# Patient Record
Sex: FEMALE | Race: OTHER | Employment: UNEMPLOYED | URBAN - METROPOLITAN AREA
[De-identification: names, ages, dates, MRNs, and addresses within clinical notes are randomized per-mention and may not be internally consistent; named-entity substitution may affect disease eponyms.]

---

## 2017-01-01 ENCOUNTER — HOSPITAL ENCOUNTER (OUTPATIENT)
Facility: HOSPITAL | Age: 57
Setting detail: OBSERVATION
Discharge: HOME | End: 2017-01-03
Admitting: HOSPITALIST
Payer: MEDICAID

## 2017-01-01 ENCOUNTER — APPOINTMENT (OUTPATIENT)
Dept: GENERAL RADIOLOGY | Facility: HOSPITAL | Age: 57
End: 2017-01-01
Payer: MEDICAID

## 2017-01-01 DIAGNOSIS — J40 BRONCHITIS: Primary | ICD-10-CM

## 2017-01-01 PROBLEM — R73.9 HYPERGLYCEMIA: Status: ACTIVE | Noted: 2017-01-01

## 2017-01-01 LAB
ANION GAP SERPL CALC-SCNC: 11 MMOL/L (ref 0–18)
BASOPHILS # BLD: 0.1 K/UL (ref 0–0.2)
BASOPHILS NFR BLD: 1 %
BUN SERPL-MCNC: 12 MG/DL (ref 8–20)
BUN/CREAT SERPL: 16.4 (ref 10–20)
CALCIUM SERPL-MCNC: 9.8 MG/DL (ref 8.5–10.5)
CHLORIDE SERPL-SCNC: 103 MMOL/L (ref 95–110)
CO2 SERPL-SCNC: 26 MMOL/L (ref 22–32)
CREAT SERPL-MCNC: 0.73 MG/DL (ref 0.5–1.5)
EOSINOPHIL # BLD: 0.9 K/UL (ref 0–0.7)
EOSINOPHIL NFR BLD: 10 %
ERYTHROCYTE [DISTWIDTH] IN BLOOD BY AUTOMATED COUNT: 14.3 % (ref 11–15)
GLUCOSE BLDC GLUCOMTR-MCNC: 140 MG/DL (ref 70–99)
GLUCOSE BLDC GLUCOMTR-MCNC: 162 MG/DL (ref 70–99)
GLUCOSE SERPL-MCNC: 179 MG/DL (ref 70–99)
HCT VFR BLD AUTO: 37.7 % (ref 35–48)
HGB BLD-MCNC: 12.6 G/DL (ref 12–16)
LYMPHOCYTES # BLD: 3.3 K/UL (ref 1–4)
LYMPHOCYTES NFR BLD: 34 %
MCH RBC QN AUTO: 27.1 PG (ref 27–32)
MCHC RBC AUTO-ENTMCNC: 33.4 G/DL (ref 32–37)
MCV RBC AUTO: 81.1 FL (ref 80–100)
MONOCYTES # BLD: 0.7 K/UL (ref 0–1)
MONOCYTES NFR BLD: 7 %
NEUTROPHILS # BLD AUTO: 4.8 K/UL (ref 1.8–7.7)
NEUTROPHILS NFR BLD: 49 %
OSMOLALITY UR CALC.SUM OF ELEC: 294 MOSM/KG (ref 275–295)
PLATELET # BLD AUTO: 360 K/UL (ref 140–400)
PMV BLD AUTO: 6.5 FL (ref 7.4–10.3)
POTASSIUM SERPL-SCNC: 3.8 MMOL/L (ref 3.3–5.1)
RBC # BLD AUTO: 4.64 M/UL (ref 3.7–5.4)
SODIUM SERPL-SCNC: 140 MMOL/L (ref 136–144)
WBC # BLD AUTO: 9.8 K/UL (ref 4–11)

## 2017-01-01 PROCEDURE — 71020 XR CHEST PA + LAT CHEST (CPT=71020): CPT

## 2017-01-01 RX ORDER — ALBUTEROL SULFATE 2.5 MG/3ML
SOLUTION RESPIRATORY (INHALATION)
Status: COMPLETED
Start: 2017-01-01 | End: 2017-01-01

## 2017-01-01 RX ORDER — IPRATROPIUM BROMIDE AND ALBUTEROL SULFATE 2.5; .5 MG/3ML; MG/3ML
3 SOLUTION RESPIRATORY (INHALATION) EVERY 6 HOURS PRN
Status: DISCONTINUED | OUTPATIENT
Start: 2017-01-01 | End: 2017-01-03

## 2017-01-01 RX ORDER — PREDNISONE 20 MG/1
40 TABLET ORAL ONCE
Status: COMPLETED | OUTPATIENT
Start: 2017-01-01 | End: 2017-01-01

## 2017-01-01 NOTE — ED INITIAL ASSESSMENT (HPI)
Pt with cough, chest congestion x1.5 weeks. Just finished amoxicillin from pmd and still not feeling well. Chest/rib pain with coughing.

## 2017-01-02 LAB
FLUAV + FLUBV RNA SPEC NAA+PROBE: NEGATIVE
FLUAV + FLUBV RNA SPEC NAA+PROBE: NEGATIVE
FLUAV + FLUBV RNA SPEC NAA+PROBE: POSITIVE
GLUCOSE BLDC GLUCOMTR-MCNC: 148 MG/DL (ref 70–99)
GLUCOSE BLDC GLUCOMTR-MCNC: 171 MG/DL (ref 70–99)
GLUCOSE BLDC GLUCOMTR-MCNC: 215 MG/DL (ref 70–99)
GLUCOSE BLDC GLUCOMTR-MCNC: 258 MG/DL (ref 70–99)
GLUCOSE BLDC GLUCOMTR-MCNC: 282 MG/DL (ref 70–99)
HBA1C MFR BLD: 6.9 % (ref 4–6)

## 2017-01-02 PROCEDURE — 99220 INITIAL OBSERVATION CARE,LEVL III: CPT | Performed by: HOSPITALIST

## 2017-01-02 RX ORDER — IPRATROPIUM BROMIDE AND ALBUTEROL SULFATE 2.5; .5 MG/3ML; MG/3ML
3 SOLUTION RESPIRATORY (INHALATION) EVERY 4 HOURS
Status: DISCONTINUED | OUTPATIENT
Start: 2017-01-02 | End: 2017-01-03

## 2017-01-02 RX ORDER — PREDNISONE 20 MG/1
60 TABLET ORAL
Status: DISCONTINUED | OUTPATIENT
Start: 2017-01-02 | End: 2017-01-03

## 2017-01-02 RX ORDER — BENZONATATE 100 MG/1
100 CAPSULE ORAL 3 TIMES DAILY PRN
Status: DISCONTINUED | OUTPATIENT
Start: 2017-01-02 | End: 2017-01-03

## 2017-01-02 RX ORDER — DEXTROSE MONOHYDRATE 25 G/50ML
50 INJECTION, SOLUTION INTRAVENOUS AS NEEDED
Status: DISCONTINUED | OUTPATIENT
Start: 2017-01-02 | End: 2017-01-03

## 2017-01-02 RX ORDER — PREDNISONE 20 MG/1
60 TABLET ORAL DAILY
Status: DISCONTINUED | OUTPATIENT
Start: 2017-01-02 | End: 2017-01-02

## 2017-01-02 RX ORDER — HEPARIN SODIUM 5000 [USP'U]/ML
5000 INJECTION, SOLUTION INTRAVENOUS; SUBCUTANEOUS EVERY 12 HOURS
Status: DISCONTINUED | OUTPATIENT
Start: 2017-01-02 | End: 2017-01-03

## 2017-01-02 RX ORDER — PRAVASTATIN SODIUM 10 MG
10 TABLET ORAL NIGHTLY
Status: DISCONTINUED | OUTPATIENT
Start: 2017-01-02 | End: 2017-01-03

## 2017-01-02 RX ORDER — SODIUM CHLORIDE 0.9 % (FLUSH) 0.9 %
SYRINGE (ML) INJECTION
Status: COMPLETED
Start: 2017-01-02 | End: 2017-01-02

## 2017-01-02 RX ORDER — SODIUM CHLORIDE 9 MG/ML
INJECTION, SOLUTION INTRAVENOUS ONCE
Status: COMPLETED | OUTPATIENT
Start: 2017-01-02 | End: 2017-01-02

## 2017-01-02 RX ORDER — PRAVASTATIN SODIUM 10 MG
10 TABLET ORAL NIGHTLY
Status: DISCONTINUED | OUTPATIENT
Start: 2017-01-02 | End: 2017-01-02

## 2017-01-02 RX ORDER — SODIUM CHLORIDE 9 MG/ML
INJECTION, SOLUTION INTRAVENOUS CONTINUOUS
Status: DISCONTINUED | OUTPATIENT
Start: 2017-01-02 | End: 2017-01-03

## 2017-01-02 RX ORDER — ZOLPIDEM TARTRATE 5 MG/1
5 TABLET ORAL NIGHTLY PRN
Status: DISCONTINUED | OUTPATIENT
Start: 2017-01-02 | End: 2017-01-03

## 2017-01-02 RX ORDER — HEPARIN SODIUM 5000 [USP'U]/ML
5000 INJECTION, SOLUTION INTRAVENOUS; SUBCUTANEOUS EVERY 12 HOURS
Status: DISCONTINUED | OUTPATIENT
Start: 2017-01-02 | End: 2017-01-02

## 2017-01-02 NOTE — PLAN OF CARE
Patient/Family Short Term Goal Not Progressing    Hr to return to normal, shortness of breath, wheezing to resolve

## 2017-01-02 NOTE — PLAN OF CARE
Patient/Family Long Term Goal Not Progressing    Pt.'s shortness of breath, congestion, cough to be resolved; rsv infection resolved

## 2017-01-02 NOTE — H&P
[facility]    PATIENT'S NAME: Bib Kam   ATTENDING PHYSICIAN: Adolph Greene MD   PATIENT ACCOUNT#:   [id number]    LOCATION:  [address] [place] [address] RECORD #:   [id number]       YOB: 1960  ADMISSION DATE:       01/01/2017 to admission, lovastatin 10 mg p.o. nightly, glimepiride 4 mg p.o. daily, Tradjenta 5 mg p.o. daily, metformin 1000 mg p.o. twice a day. ALLERGIES:  Atorvastatin caused a rash. FAMILY HISTORY:  Her mother  in her 76s of lung cancer.   She was hemoglobin of 12.6 and a platelet count of [phone]. There were 49% neutrophils. Her glucose was 179, BUN of 12 with a creatinine of 0.73, sodium of 140, potassium 3.8, chloride 103, CO2 of 26. Chest x-ray revealed no cardiopulmonary infiltrates.

## 2017-01-02 NOTE — PLAN OF CARE
Problem: RESPIRATORY - ADULT  Goal: Achieves optimal ventilation and oxygenation  INTERVENTIONS:  - Assess for changes in respiratory status  - Assess for changes in mentation and behavior  - Position to facilitate oxygenation and minimize respiratory effo

## 2017-01-02 NOTE — PLAN OF CARE
Patient/Family Goals    • Patient/Family Long Term Goal Not Progressing    • Patient/Family Short Term Goal Not Progressing

## 2017-01-03 VITALS
HEART RATE: 109 BPM | OXYGEN SATURATION: 96 % | SYSTOLIC BLOOD PRESSURE: 152 MMHG | HEIGHT: 59 IN | RESPIRATION RATE: 18 BRPM | WEIGHT: 155 LBS | TEMPERATURE: 97 F | DIASTOLIC BLOOD PRESSURE: 78 MMHG | BODY MASS INDEX: 31.25 KG/M2

## 2017-01-03 LAB
ANION GAP SERPL CALC-SCNC: 5 MMOL/L (ref 0–18)
BASOPHILS # BLD: 0 K/UL (ref 0–0.2)
BASOPHILS NFR BLD: 0 %
BUN SERPL-MCNC: 12 MG/DL (ref 8–20)
BUN/CREAT SERPL: 19 (ref 10–20)
CALCIUM SERPL-MCNC: 8.7 MG/DL (ref 8.5–10.5)
CHLORIDE SERPL-SCNC: 109 MMOL/L (ref 95–110)
CO2 SERPL-SCNC: 24 MMOL/L (ref 22–32)
CREAT SERPL-MCNC: 0.63 MG/DL (ref 0.5–1.5)
EOSINOPHIL # BLD: 0 K/UL (ref 0–0.7)
EOSINOPHIL NFR BLD: 0 %
ERYTHROCYTE [DISTWIDTH] IN BLOOD BY AUTOMATED COUNT: 14.5 % (ref 11–15)
GLUCOSE BLDC GLUCOMTR-MCNC: 125 MG/DL (ref 70–99)
GLUCOSE SERPL-MCNC: 217 MG/DL (ref 70–99)
HCT VFR BLD AUTO: 34.3 % (ref 35–48)
HGB BLD-MCNC: 11.4 G/DL (ref 12–16)
LYMPHOCYTES # BLD: 4.5 K/UL (ref 1–4)
LYMPHOCYTES NFR BLD: 37 %
MCH RBC QN AUTO: 26.9 PG (ref 27–32)
MCHC RBC AUTO-ENTMCNC: 33.3 G/DL (ref 32–37)
MCV RBC AUTO: 80.9 FL (ref 80–100)
MONOCYTES # BLD: 0.8 K/UL (ref 0–1)
MONOCYTES NFR BLD: 7 %
NEUTROPHILS # BLD AUTO: 6.9 K/UL (ref 1.8–7.7)
NEUTROPHILS NFR BLD: 56 %
OSMOLALITY UR CALC.SUM OF ELEC: 292 MOSM/KG (ref 275–295)
PLATELET # BLD AUTO: 362 K/UL (ref 140–400)
PMV BLD AUTO: 6.8 FL (ref 7.4–10.3)
POTASSIUM SERPL-SCNC: 3.6 MMOL/L (ref 3.3–5.1)
RBC # BLD AUTO: 4.24 M/UL (ref 3.7–5.4)
SODIUM SERPL-SCNC: 138 MMOL/L (ref 136–144)
WBC # BLD AUTO: 12.4 K/UL (ref 4–11)

## 2017-01-03 PROCEDURE — 99217 OBSERVATION CARE DISCHARGE: CPT | Performed by: HOSPITALIST

## 2017-01-03 RX ORDER — IPRATROPIUM BROMIDE AND ALBUTEROL SULFATE 2.5; .5 MG/3ML; MG/3ML
3 SOLUTION RESPIRATORY (INHALATION) EVERY 6 HOURS PRN
Qty: 360 ML | Refills: 0 | Status: SHIPPED | OUTPATIENT
Start: 2017-01-03 | End: 2017-02-20

## 2017-01-03 RX ORDER — HYDROCODONE POLISTIREX AND CHLORPHENIRAMINE POLISTIREX 10; 8 MG/5ML; MG/5ML
5 SUSPENSION, EXTENDED RELEASE ORAL 2 TIMES DAILY PRN
Qty: 140 ML | Refills: 0 | Status: SHIPPED | OUTPATIENT
Start: 2017-01-03 | End: 2017-01-17

## 2017-01-03 RX ORDER — PREDNISONE 20 MG/1
20 TABLET ORAL
Qty: 9 TABLET | Refills: 0 | Status: SHIPPED | OUTPATIENT
Start: 2017-01-03 | End: 2017-07-06 | Stop reason: ALTCHOICE

## 2017-01-03 RX ORDER — HYDROCODONE POLISTIREX AND CHLORPHENIRAMINE POLISTIREX 10; 8 MG/5ML; MG/5ML
5 SUSPENSION, EXTENDED RELEASE ORAL 2 TIMES DAILY PRN
Status: DISCONTINUED | OUTPATIENT
Start: 2017-01-03 | End: 2017-01-03

## 2017-01-03 NOTE — PLAN OF CARE
Patient to be discharged today/ received med valentin prior to discharge. Mask given to patient to continue med nebs at home. Prescriptions  Given and discharge instructions with follow up care given to patient and .   Both verbalized understanding of d

## 2017-01-03 NOTE — DISCHARGE SUMMARY
[facility]  Discharge Summary    Alycia Carrel Patient Status:  Observation    1960 MRN [id number]   Location [address] [facility] Attending Dwayne Lee MD   Hosp Day # 2 PCP Britney Blankenship MD     Date of Admission: 2017    Date of D 1 tablet (4 mg total) by mouth every morning before breakfast.  Qty: 90 tablet Refills: 0    Blood Glucose Monitoring Suppl (TRUE METRIX METER) W/DEVICE Does not apply Kit  1 kit by Does not apply route 2 (two) times daily.   Qty: 1 kit Refills: 0    Glucos then 20mg x 3 days    Diabetes mellitus type 2.    -hba1c 6.9  -cont home meds    Hyperlipidemia.  Continue statin.        Physical Exam:  Gen: A+Ox 3, nad  Chest:  B/l scattered rales  Heart:  RRR S1,S2  Abdomen:  Soft, NT, BS+  Extremities:  No Edema

## 2017-01-03 NOTE — PLAN OF CARE
Problem: Diabetes/Glucose Control  Goal: Glucose maintained within prescribed range  INTERVENTIONS:  - Monitor Blood Glucose as ordered  - Assess for signs and symptoms of hyperglycemia and hypoglycemia  - Administer ordered medications to maintain glucose

## 2017-01-03 NOTE — PLAN OF CARE
Diabetes/Glucose Control    • Glucose maintained within prescribed range Progressing        Patient/Family Goals    • Patient/Family Long Term Goal Progressing    • Patient/Family Short Term Goal Progressing        RESPIRATORY - ADULT    • Achieves optimal

## 2017-01-03 NOTE — DISCHARGE PLANNING
E Brie Lopez notified of referral for home oxygen. Sleep study in progress    Per Dheeraj Mi pt not qualified for home oxygen.  Referrral cancelled

## 2017-01-04 ENCOUNTER — TELEPHONE (OUTPATIENT)
Dept: MEDSURG UNIT | Facility: HOSPITAL | Age: 57
End: 2017-01-04

## 2017-01-04 ENCOUNTER — TELEPHONE (OUTPATIENT)
Dept: FAMILY MEDICINE CLINIC | Facility: CLINIC | Age: 57
End: 2017-01-04

## 2017-01-05 ENCOUNTER — TELEPHONE (OUTPATIENT)
Dept: INTERNAL MEDICINE UNIT | Facility: HOSPITAL | Age: 57
End: 2017-01-05

## 2017-01-05 NOTE — ED PROVIDER NOTES
Patient Seen in: [facility]    History   Patient presents with:  Cough/URI    Stated Complaint: Bronchitis    HPI     49-year-old female with history of diabetes and hyperlipidemia presents for evaluation of cough.   Patient reports cough for the METRIX BLOOD GLUCOSE TEST) In Vitro Strip,  1 each by Other route 2 (two) times daily.  Apply one strip twice a day for blood glucose monitoring   Blood Glucose Monitoring Suppl (GLUCO PERFECT 3 METER) Does not apply Device,  Inject 1 Device into the skin 2 respiratory distress. Dry bronchospastic cough noted, diffuse wheezing with diminished airflow   Abdominal: Soft. Bowel sounds are normal. She exhibits no distension. There is no tenderness. There is no rebound and no guarding.    Musculoskeletal: Normal Notable for the following:     POC Glucose  258 (*)     All other components within normal limits   POCT GLUCOSE - Abnormal; Notable for the following:     POC Glucose  215 (*)     All other components within normal limits   POCT GLUCOSE - Abnormal; Notabl TALL (BNP)       MDM   Differential diagnosis includes pneumonia, bronchitis, malignancy, viral infection, less likely ACS, PE    Patient with reduced wheezing after 1 hr albuterol treatment, moving air better but still with scattered wheezes.   Started on

## 2017-01-06 ENCOUNTER — TELEPHONE (OUTPATIENT)
Dept: MEDSURG UNIT | Facility: HOSPITAL | Age: 57
End: 2017-01-06

## 2017-01-09 RX ORDER — CODEINE PHOSPHATE/GUAIFENESIN 20-200/10
LIQUID (ML) ORAL
Qty: 180 ML | Refills: 0 | OUTPATIENT
Start: 2017-01-09 | End: 2017-01-10

## 2017-01-09 NOTE — TELEPHONE ENCOUNTER
Pt contacted, seen in [facility] for bronchitis on 1/1/17 and d/c on 1/3/17.  Was prescribed cough syrup as seen on med module:  Hydrocod Polst-CPM Polst ER 10-8 MG/5ML Oral Suspension Extended Release 140 mL 0     however rx not covered by insurance and pharmacy

## 2017-01-10 ENCOUNTER — TELEPHONE (OUTPATIENT)
Dept: FAMILY MEDICINE CLINIC | Facility: CLINIC | Age: 57
End: 2017-01-10

## 2017-01-10 ENCOUNTER — OFFICE VISIT (OUTPATIENT)
Dept: FAMILY MEDICINE CLINIC | Facility: CLINIC | Age: 57
End: 2017-01-10

## 2017-01-10 ENCOUNTER — HOSPITAL ENCOUNTER (OUTPATIENT)
Dept: GENERAL RADIOLOGY | Age: 57
Discharge: HOME | End: 2017-01-10
Attending: FAMILY MEDICINE
Payer: MEDICAID

## 2017-01-10 VITALS
HEART RATE: 98 BPM | BODY MASS INDEX: 31.04 KG/M2 | TEMPERATURE: 98 F | OXYGEN SATURATION: 96 % | DIASTOLIC BLOOD PRESSURE: 76 MMHG | WEIGHT: 154 LBS | SYSTOLIC BLOOD PRESSURE: 144 MMHG | HEIGHT: 59 IN

## 2017-01-10 DIAGNOSIS — R05.9 COUGH: ICD-10-CM

## 2017-01-10 DIAGNOSIS — J21.0 RSV BRONCHIOLITIS: ICD-10-CM

## 2017-01-10 DIAGNOSIS — J32.0 LEFT MAXILLARY SINUSITIS: Primary | ICD-10-CM

## 2017-01-10 PROCEDURE — 99212 OFFICE O/P EST SF 10 MIN: CPT | Performed by: FAMILY MEDICINE

## 2017-01-10 PROCEDURE — 99214 OFFICE O/P EST MOD 30 MIN: CPT | Performed by: FAMILY MEDICINE

## 2017-01-10 PROCEDURE — 71020 XR CHEST PA + LAT CHEST (CPT=71020): CPT

## 2017-01-10 RX ORDER — CODEINE PHOSPHATE AND GUAIFENESIN 10; 100 MG/5ML; MG/5ML
10 SOLUTION ORAL 3 TIMES DAILY PRN
Qty: 180 ML | Refills: 0 | Status: SHIPPED
Start: 2017-01-10 | End: 2017-07-06 | Stop reason: ALTCHOICE

## 2017-01-10 RX ORDER — AZITHROMYCIN 250 MG/1
TABLET, FILM COATED ORAL
Qty: 6 TABLET | Refills: 0 | Status: SHIPPED | OUTPATIENT
Start: 2017-01-10 | End: 2017-01-12 | Stop reason: ALTCHOICE

## 2017-01-10 NOTE — PROGRESS NOTES
HPI:    Patient ID: Jose Castillo is a 60year old female. HPI     Patient here for follow up from [facility]. Was admitted recently for RSV bronchiolitis. Discharged on tapered on prednisone.   Feels better  stil coughing  No sob  Has pressure left side fa times daily as needed (cough). Disp: 140 mL Rfl: 0   Lovastatin 10 MG Oral Tab Take 1 tablet (10 mg total) by mouth nightly. Disp: 90 tablet Rfl: 0   Linagliptin (TRADJENTA) 5 MG Oral Tab Take 1 tablet by mouth once daily.  Disp: 90 tablet Rfl: 0   MetFORMI tenderness. ASSESSMENT/PLAN:   Cough  Left maxillary sinusitis  (primary encounter diagnosis)  Rsv bronchiolitis     1. Cough  Recheck cxr  - XR CHEST PA + LAT CHEST (CPT=71020); Future    2. Left maxillary sinusitis  zpak as directed      3.

## 2017-01-10 NOTE — TELEPHONE ENCOUNTER
Per daughter of pt,  Pt don't want Mychart and would like to know about pt Xray that was done today. Pt is very anxious to know.

## 2017-01-11 NOTE — TELEPHONE ENCOUNTER
Pt's daughter in law Becki Blood, states pt is still not feeling any better. States her cough continues, and yesterdays she was having chest pain. They are still waiting on results. Please advise.

## 2017-01-11 NOTE — TELEPHONE ENCOUNTER
I spoke to daughter and inform her the x ray was normal. She stated that pt continues to cough and was having some chest pain yesterday.  Daughter stated that pt is taking the cough medication ou send her but she continues with the cough and mother was thin

## 2017-01-11 NOTE — TELEPHONE ENCOUNTER
Pt's daughter is calling back. Please advise  States Pt is not feeling well, coughing continues and had chest pain yesterday.

## 2017-01-12 RX ORDER — LEVOFLOXACIN 500 MG/1
500 TABLET, FILM COATED ORAL DAILY
Qty: 5 TABLET | Refills: 0 | Status: SHIPPED | OUTPATIENT
Start: 2017-01-12 | End: 2017-01-17

## 2017-01-12 NOTE — TELEPHONE ENCOUNTER
Pt informed of Dr Christiana Chambers response below. Pt agrees and has scheduled appt for Monday 1/16/17 at 11am in [facility] with AMA; and will stop azithromycin and start levaquin instead (eRx sent to [facility]). Will call sooner if s/sx worsen.

## 2017-01-12 NOTE — TELEPHONE ENCOUNTER
Call transferred by CSS: Daughter Yeny Blair informed of Dr Trang Hernandez ER recommendation if pt is not feeling better. Judith reports pt reported feeling better last night and that she was able to sleep last night. Denies SOB, fever.  Daughter reports chest pain mentioned

## 2017-01-12 NOTE — TELEPHONE ENCOUNTER
Can change cough syrup to other one. Cough syrup already has hydrocodone.   Would recommend stopping azithromycin and changing to levaquin 500mg by mouth daily x 5 days  Follow up Monday with me

## 2017-01-12 NOTE — TELEPHONE ENCOUNTER
Call transferred by CSS: Pt daughter-in-law Saúl Courser asking if Dr Nash Cooney can prescribe a different pain medication that was prescribed by ER that was not being covered= Hydrocod Polst-CPM Polst ER 10-8 MG/5ML. Informed that is more for cough.   States wants somethi

## 2017-02-08 ENCOUNTER — LAB ENCOUNTER (OUTPATIENT)
Dept: LAB | Age: 57
End: 2017-02-08
Attending: FAMILY MEDICINE
Payer: MEDICAID

## 2017-02-08 ENCOUNTER — OFFICE VISIT (OUTPATIENT)
Dept: FAMILY MEDICINE CLINIC | Facility: CLINIC | Age: 57
End: 2017-02-08

## 2017-02-08 VITALS
DIASTOLIC BLOOD PRESSURE: 67 MMHG | SYSTOLIC BLOOD PRESSURE: 130 MMHG | TEMPERATURE: 98 F | BODY MASS INDEX: 30.64 KG/M2 | HEIGHT: 59 IN | OXYGEN SATURATION: 96 % | WEIGHT: 152 LBS | HEART RATE: 85 BPM

## 2017-02-08 DIAGNOSIS — Z00.00 WELL ADULT EXAM: ICD-10-CM

## 2017-02-08 DIAGNOSIS — L84 CALLUS: ICD-10-CM

## 2017-02-08 DIAGNOSIS — M25.50 POLYARTHRALGIA: ICD-10-CM

## 2017-02-08 DIAGNOSIS — E78.00 HYPERCHOLESTEREMIA: ICD-10-CM

## 2017-02-08 DIAGNOSIS — M21.962 FOOT DEFORMITY, LEFT: ICD-10-CM

## 2017-02-08 DIAGNOSIS — E11.319 CONTROLLED TYPE 2 DIABETES MELLITUS WITH RETINOPATHY, WITHOUT LONG-TERM CURRENT USE OF INSULIN, MACULAR EDEMA PRESENCE UNSPECIFIED, UNSPECIFIED LATERALITY, UNSPECIFIED RETINOPATHY SEVERITY (HCC): Primary | ICD-10-CM

## 2017-02-08 DIAGNOSIS — IMO0001 UNCONTROLLED TYPE 2 DIABETES MELLITUS WITHOUT COMPLICATION, WITHOUT LONG-TERM CURRENT USE OF INSULIN: ICD-10-CM

## 2017-02-08 PROBLEM — M21.969 FOOT DEFORMITY: Status: ACTIVE | Noted: 2017-02-08

## 2017-02-08 LAB
ALT SERPL-CCNC: 25 U/L (ref 14–54)
ANION GAP SERPL CALC-SCNC: 9 MMOL/L (ref 0–18)
AST SERPL-CCNC: 23 U/L (ref 15–41)
BASOPHILS # BLD: 0.1 K/UL (ref 0–0.2)
BASOPHILS NFR BLD: 1 %
BUN SERPL-MCNC: 8 MG/DL (ref 8–20)
BUN/CREAT SERPL: 13.1 (ref 10–20)
CALCIUM SERPL-MCNC: 10 MG/DL (ref 8.5–10.5)
CHLORIDE SERPL-SCNC: 103 MMOL/L (ref 95–110)
CHOLEST SERPL-MCNC: 181 MG/DL (ref 110–200)
CO2 SERPL-SCNC: 28 MMOL/L (ref 22–32)
CREAT SERPL-MCNC: 0.61 MG/DL (ref 0.5–1.5)
EOSINOPHIL # BLD: 0.9 K/UL (ref 0–0.7)
EOSINOPHIL NFR BLD: 9 %
ERYTHROCYTE [DISTWIDTH] IN BLOOD BY AUTOMATED COUNT: 14.4 % (ref 11–15)
GLUCOSE SERPL-MCNC: 147 MG/DL (ref 70–99)
HBA1C MFR BLD: 7.2 % (ref 4–6)
HCT VFR BLD AUTO: 39.9 % (ref 35–48)
HDLC SERPL-MCNC: 76 MG/DL
HGB BLD-MCNC: 13.2 G/DL (ref 12–16)
LDLC SERPL CALC-MCNC: 87 MG/DL (ref 0–99)
LYMPHOCYTES # BLD: 3.2 K/UL (ref 1–4)
LYMPHOCYTES NFR BLD: 32 %
MCH RBC QN AUTO: 26.9 PG (ref 27–32)
MCHC RBC AUTO-ENTMCNC: 33.2 G/DL (ref 32–37)
MCV RBC AUTO: 81 FL (ref 80–100)
MONOCYTES # BLD: 0.5 K/UL (ref 0–1)
MONOCYTES NFR BLD: 5 %
NEUTROPHILS # BLD AUTO: 5.3 K/UL (ref 1.8–7.7)
NEUTROPHILS NFR BLD: 53 %
NONHDLC SERPL-MCNC: 105 MG/DL
OSMOLALITY UR CALC.SUM OF ELEC: 291 MOSM/KG (ref 275–295)
PLATELET # BLD AUTO: 409 K/UL (ref 140–400)
PMV BLD AUTO: 6.8 FL (ref 7.4–10.3)
POTASSIUM SERPL-SCNC: 4.3 MMOL/L (ref 3.3–5.1)
RBC # BLD AUTO: 4.92 M/UL (ref 3.7–5.4)
SODIUM SERPL-SCNC: 140 MMOL/L (ref 136–144)
TRIGL SERPL-MCNC: 88 MG/DL (ref 1–149)
TSH SERPL-ACNC: 1.82 UIU/ML (ref 0.34–5.6)
VIT B12 SERPL-MCNC: 220 PG/ML (ref 181–914)
WBC # BLD AUTO: 10 K/UL (ref 4–11)

## 2017-02-08 PROCEDURE — 85025 COMPLETE CBC W/AUTO DIFF WBC: CPT

## 2017-02-08 PROCEDURE — 84443 ASSAY THYROID STIM HORMONE: CPT

## 2017-02-08 PROCEDURE — 36415 COLL VENOUS BLD VENIPUNCTURE: CPT

## 2017-02-08 PROCEDURE — 84450 TRANSFERASE (AST) (SGOT): CPT

## 2017-02-08 PROCEDURE — 82306 VITAMIN D 25 HYDROXY: CPT

## 2017-02-08 PROCEDURE — 83036 HEMOGLOBIN GLYCOSYLATED A1C: CPT

## 2017-02-08 PROCEDURE — 99212 OFFICE O/P EST SF 10 MIN: CPT | Performed by: FAMILY MEDICINE

## 2017-02-08 PROCEDURE — 80061 LIPID PANEL: CPT

## 2017-02-08 PROCEDURE — 82607 VITAMIN B-12: CPT

## 2017-02-08 PROCEDURE — 80048 BASIC METABOLIC PNL TOTAL CA: CPT

## 2017-02-08 PROCEDURE — 84460 ALANINE AMINO (ALT) (SGPT): CPT

## 2017-02-08 PROCEDURE — 99214 OFFICE O/P EST MOD 30 MIN: CPT | Performed by: FAMILY MEDICINE

## 2017-02-08 NOTE — PROGRESS NOTES
HPI:    Patient ID: Pamela Franklin is a 60year old female. HPI Comments: Presents with concerns of worsening arthritis. Reports increased pain to knees, shoulders and hands. States she has noticed swelling in her hands recently.  Reports taking hydrocod Take 10 mL by mouth 3 (three) times daily as needed for cough. Disp: 180 mL Rfl: 0   Fluticasone Furoate-Vilanterol 100-25 MCG/INH Inhalation Aerosol Powder, Breath Activated Inhale 1 puff into the lungs daily.  Disp: 28 each Rfl: 0   predniSONE 20 MG Oral Neurological: She is alert and oriented to person, place, and time. Skin: Skin is dry. Calluses and dryness with cracking to bilateral feet               ASSESSMENT/PLAN:     1. Hypercholesteremia  Stop lovastatin x1 month. Manage with healthy diet.

## 2017-02-10 LAB — 25(OH)D3 SERPL-MCNC: 68.7 NG/ML

## 2017-02-23 RX ORDER — IPRATROPIUM BROMIDE AND ALBUTEROL SULFATE 2.5; .5 MG/3ML; MG/3ML
SOLUTION RESPIRATORY (INHALATION)
Qty: 360 ML | Refills: 0 | Status: SHIPPED | OUTPATIENT
Start: 2017-02-23 | End: 2017-08-08

## 2017-02-23 NOTE — TELEPHONE ENCOUNTER
Refill Protocol Appointment Criteria  · Appointment scheduled in the past 6 months or in the next 3 months  Recent Visits       Provider Department Primary Dx    2 weeks ago Abby Saxena MD [facility], [facility] type 2 diab

## 2017-02-24 RX ORDER — FLUTICASONE FUROATE AND VILANTEROL TRIFENATATE 100; 25 UG/1; UG/1
POWDER RESPIRATORY (INHALATION)
Qty: 60 EACH | Refills: 2 | Status: SHIPPED | OUTPATIENT
Start: 2017-02-24 | End: 2017-08-08

## 2017-02-24 NOTE — TELEPHONE ENCOUNTER
Refilled per protocol  · Appointment scheduled in the past 12 months or in the next 3 months  Recent Visits       Provider Department Primary Dx    2 weeks ago Titus Mays MD [facility], [facility], Pat Controlled type 2 diabetes mellitus

## 2017-04-06 RX ORDER — GLIMEPIRIDE 4 MG/1
TABLET ORAL
Qty: 90 TABLET | Refills: 0 | Status: SHIPPED | OUTPATIENT
Start: 2017-04-06 | End: 2017-07-01

## 2017-04-06 NOTE — TELEPHONE ENCOUNTER
Diabetes Medications: Medication filled for 90 days per protocol.     Protocol Criteria:  · Appointment scheduled in the past 6 months or the next 3 months  · A1C < 7.5 in the past 6 months  · Creatinine in the past 12 months  · Creatinine result < 1.5   Re

## 2017-07-06 ENCOUNTER — OFFICE VISIT (OUTPATIENT)
Dept: FAMILY MEDICINE CLINIC | Facility: CLINIC | Age: 57
End: 2017-07-06

## 2017-07-06 VITALS
HEIGHT: 59 IN | SYSTOLIC BLOOD PRESSURE: 110 MMHG | HEART RATE: 89 BPM | TEMPERATURE: 98 F | WEIGHT: 155 LBS | BODY MASS INDEX: 31.25 KG/M2 | DIASTOLIC BLOOD PRESSURE: 73 MMHG

## 2017-07-06 DIAGNOSIS — E66.9 OBESITY (BMI 30.0-34.9): ICD-10-CM

## 2017-07-06 DIAGNOSIS — IMO0001 UNCONTROLLED TYPE 2 DIABETES MELLITUS WITHOUT COMPLICATION, WITHOUT LONG-TERM CURRENT USE OF INSULIN: Primary | ICD-10-CM

## 2017-07-06 DIAGNOSIS — Z12.11 COLON CANCER SCREENING: ICD-10-CM

## 2017-07-06 DIAGNOSIS — E78.00 HYPERCHOLESTEREMIA: ICD-10-CM

## 2017-07-06 DIAGNOSIS — M25.50 POLYARTHRALGIA: ICD-10-CM

## 2017-07-06 PROCEDURE — 99214 OFFICE O/P EST MOD 30 MIN: CPT | Performed by: FAMILY MEDICINE

## 2017-07-06 PROCEDURE — 99212 OFFICE O/P EST SF 10 MIN: CPT | Performed by: FAMILY MEDICINE

## 2017-07-06 NOTE — PROGRESS NOTES
HPI:    Patient ID: Alycia Carrel is a 59year old female. Diabetes   She presents for her follow-up diabetic visit. She has type 2 diabetes mellitus. Hypoglycemia symptoms include nervousness/anxiousness.  Pertinent negatives for hypoglycemia include n Pulse 89   Temp 97.9 °F (36.6 °C) (Oral)   Ht 4' 11\" (1.499 m)   Wt 155 lb (70.3 kg)   LMP  (Exact Date)   BMI 31.31 kg/m²        Current Outpatient Prescriptions:  METFORMIN HCL 1000 MG Oral Tab TAKE 1 TABLET (1,000 MG TOTAL) BY MOUTH 2 (TWO) TIMES DOE No thyromegaly present. Cardiovascular: Normal rate and regular rhythm. Pulmonary/Chest: Effort normal and breath sounds normal.   Abdominal: Soft. Bowel sounds are normal. There is no tenderness. Musculoskeletal: She exhibits no edema.    Cuong Choi Microalb/Creat Ratio, Random Urine [E]      Occult Blood, Fecal, Immunoassay [E]      Uric Acid, Serum [E]    Meds This Visit:  No prescriptions requested or ordered in this encounter    Imaging & Referrals:  RHEUMATOLOGY - INTERNAL  OPTOMETRY - INTERNAL

## 2017-07-08 RX ORDER — GLIMEPIRIDE 4 MG/1
TABLET ORAL
Qty: 90 TABLET | Refills: 0 | Status: SHIPPED | OUTPATIENT
Start: 2017-07-08 | End: 2017-12-17

## 2017-07-17 ENCOUNTER — APPOINTMENT (OUTPATIENT)
Dept: LAB | Age: 57
End: 2017-07-17
Attending: FAMILY MEDICINE
Payer: COMMERCIAL

## 2017-07-17 DIAGNOSIS — IMO0001 UNCONTROLLED TYPE 2 DIABETES MELLITUS WITHOUT COMPLICATION, WITHOUT LONG-TERM CURRENT USE OF INSULIN: ICD-10-CM

## 2017-07-17 DIAGNOSIS — M25.50 POLYARTHRALGIA: ICD-10-CM

## 2017-07-17 DIAGNOSIS — E78.00 HYPERCHOLESTEREMIA: ICD-10-CM

## 2017-07-17 LAB
ALT SERPL-CCNC: 28 U/L (ref 14–54)
ANION GAP SERPL CALC-SCNC: 8 MMOL/L (ref 0–18)
AST SERPL-CCNC: 24 U/L (ref 15–41)
BUN SERPL-MCNC: 6 MG/DL (ref 8–20)
BUN/CREAT SERPL: 8.2 (ref 10–20)
CALCIUM SERPL-MCNC: 9.5 MG/DL (ref 8.5–10.5)
CHLORIDE SERPL-SCNC: 103 MMOL/L (ref 95–110)
CHOLEST SERPL-MCNC: 215 MG/DL (ref 110–200)
CO2 SERPL-SCNC: 26 MMOL/L (ref 22–32)
CREAT SERPL-MCNC: 0.73 MG/DL (ref 0.5–1.5)
CREAT UR-MCNC: 139 MG/DL
GLUCOSE SERPL-MCNC: 161 MG/DL (ref 70–99)
HBA1C MFR BLD: 7.9 % (ref 4–6)
HDLC SERPL-MCNC: 70 MG/DL
LDLC SERPL CALC-MCNC: 120 MG/DL (ref 0–99)
MICROALBUMIN UR-MCNC: 2.8 MG/DL (ref 0–1.8)
MICROALBUMIN/CREAT UR: 20.1 MG/G{CREAT} (ref 0–20)
NONHDLC SERPL-MCNC: 145 MG/DL
OSMOLALITY UR CALC.SUM OF ELEC: 285 MOSM/KG (ref 275–295)
POTASSIUM SERPL-SCNC: 4.2 MMOL/L (ref 3.3–5.1)
SODIUM SERPL-SCNC: 137 MMOL/L (ref 136–144)
TRIGL SERPL-MCNC: 124 MG/DL (ref 1–149)
URATE SERPL-MCNC: 4.7 MG/DL (ref 2.1–7.4)

## 2017-07-17 PROCEDURE — 84460 ALANINE AMINO (ALT) (SGPT): CPT

## 2017-07-17 PROCEDURE — 82043 UR ALBUMIN QUANTITATIVE: CPT

## 2017-07-17 PROCEDURE — 36415 COLL VENOUS BLD VENIPUNCTURE: CPT

## 2017-07-17 PROCEDURE — 82570 ASSAY OF URINE CREATININE: CPT

## 2017-07-17 PROCEDURE — 84550 ASSAY OF BLOOD/URIC ACID: CPT

## 2017-07-17 PROCEDURE — 80048 BASIC METABOLIC PNL TOTAL CA: CPT

## 2017-07-17 PROCEDURE — 80061 LIPID PANEL: CPT

## 2017-07-17 PROCEDURE — 83036 HEMOGLOBIN GLYCOSYLATED A1C: CPT

## 2017-07-17 PROCEDURE — 84450 TRANSFERASE (AST) (SGOT): CPT

## 2017-07-18 ENCOUNTER — TELEPHONE (OUTPATIENT)
Dept: FAMILY MEDICINE CLINIC | Facility: CLINIC | Age: 57
End: 2017-07-18

## 2017-08-08 ENCOUNTER — OFFICE VISIT (OUTPATIENT)
Dept: RHEUMATOLOGY | Facility: CLINIC | Age: 57
End: 2017-08-08

## 2017-08-08 VITALS
HEIGHT: 58 IN | BODY MASS INDEX: 33 KG/M2 | DIASTOLIC BLOOD PRESSURE: 84 MMHG | HEART RATE: 112 BPM | TEMPERATURE: 98 F | WEIGHT: 157.19 LBS | SYSTOLIC BLOOD PRESSURE: 138 MMHG | OXYGEN SATURATION: 94 %

## 2017-08-08 DIAGNOSIS — R53.83 FATIGUE, UNSPECIFIED TYPE: ICD-10-CM

## 2017-08-08 DIAGNOSIS — IMO0001 UNCONTROLLED TYPE 2 DIABETES MELLITUS WITHOUT COMPLICATION, WITHOUT LONG-TERM CURRENT USE OF INSULIN: ICD-10-CM

## 2017-08-08 DIAGNOSIS — E66.9 OBESITY (BMI 30.0-34.9): ICD-10-CM

## 2017-08-08 DIAGNOSIS — M25.50 POLYARTHRALGIA: Primary | ICD-10-CM

## 2017-08-08 DIAGNOSIS — F41.9 ANXIETY: ICD-10-CM

## 2017-08-08 PROCEDURE — 99212 OFFICE O/P EST SF 10 MIN: CPT | Performed by: INTERNAL MEDICINE

## 2017-08-08 PROCEDURE — 99244 OFF/OP CNSLTJ NEW/EST MOD 40: CPT | Performed by: INTERNAL MEDICINE

## 2017-08-08 NOTE — PROGRESS NOTES
Dear Dr. Peyton Ervin:    I saw your patient Grecia Larsen in consultation this afternoon at your request for evaluation of diffuse musculoskeletal pain.   As you know, she is a 49-year-old woman who has a history of bilateral shoulder discomfort, but 5 months ago weeks.  She has had shingles over her forehead. She is on glimepiride, linagliptin, Metformin. Occasional tramadol for severe pain. Family history:  Negative for arthritis or autoimmune disorders. Social history:  She is  with 3 children.   Elijah Gun limited and painful bilaterally. There is marked crepitance in her knees, with flexion and extension. Her history isn't good for autoimmune disorder. She could have rotator cuff pathology and knee osteoarthritis. X-rays will be done.   Multiple labs chica

## 2017-08-15 ENCOUNTER — HOSPITAL ENCOUNTER (OUTPATIENT)
Dept: GENERAL RADIOLOGY | Age: 57
Discharge: HOME | End: 2017-08-15
Attending: INTERNAL MEDICINE
Payer: COMMERCIAL

## 2017-08-15 ENCOUNTER — APPOINTMENT (OUTPATIENT)
Dept: LAB | Age: 57
End: 2017-08-15
Attending: INTERNAL MEDICINE
Payer: COMMERCIAL

## 2017-08-15 ENCOUNTER — TELEPHONE (OUTPATIENT)
Dept: FAMILY MEDICINE CLINIC | Facility: CLINIC | Age: 57
End: 2017-08-15

## 2017-08-15 DIAGNOSIS — M25.50 POLYARTHRALGIA: ICD-10-CM

## 2017-08-15 DIAGNOSIS — R53.83 FATIGUE, UNSPECIFIED TYPE: ICD-10-CM

## 2017-08-15 LAB
ALBUMIN SERPL BCP-MCNC: 3.7 G/DL (ref 3.5–4.8)
ALBUMIN/GLOB SERPL: 1.1 {RATIO} (ref 1–2)
ALP SERPL-CCNC: 69 U/L (ref 32–100)
ALT SERPL-CCNC: 27 U/L (ref 14–54)
ANION GAP SERPL CALC-SCNC: 8 MMOL/L (ref 0–18)
AST SERPL-CCNC: 22 U/L (ref 15–41)
BILIRUB SERPL-MCNC: 0.5 MG/DL (ref 0.3–1.2)
BUN SERPL-MCNC: 8 MG/DL (ref 8–20)
BUN/CREAT SERPL: 13.1 (ref 10–20)
C3 SERPL-MCNC: 174 MG/DL (ref 88–201)
C4 SERPL-MCNC: 30 MG/DL (ref 18–55)
CALCIUM SERPL-MCNC: 9.7 MG/DL (ref 8.5–10.5)
CHLORIDE SERPL-SCNC: 104 MMOL/L (ref 95–110)
CO2 SERPL-SCNC: 26 MMOL/L (ref 22–32)
CREAT SERPL-MCNC: 0.61 MG/DL (ref 0.5–1.5)
CRP SERPL-MCNC: 0.7 MG/DL (ref 0–0.9)
ERYTHROCYTE [DISTWIDTH] IN BLOOD BY AUTOMATED COUNT: 14.1 % (ref 11–15)
ERYTHROCYTE [SEDIMENTATION RATE] IN BLOOD: 17 MM/HR (ref 0–30)
GLOBULIN PLAS-MCNC: 3.3 G/DL (ref 2.5–3.7)
GLUCOSE SERPL-MCNC: 142 MG/DL (ref 70–99)
HCT VFR BLD AUTO: 38.7 % (ref 35–48)
HGB BLD-MCNC: 12.8 G/DL (ref 12–16)
MCH RBC QN AUTO: 26.7 PG (ref 27–32)
MCHC RBC AUTO-ENTMCNC: 33.1 G/DL (ref 32–37)
MCV RBC AUTO: 80.8 FL (ref 80–100)
OSMOLALITY UR CALC.SUM OF ELEC: 287 MOSM/KG (ref 275–295)
PLATELET # BLD AUTO: 410 K/UL (ref 140–400)
PMV BLD AUTO: 6.8 FL (ref 7.4–10.3)
POTASSIUM SERPL-SCNC: 4.1 MMOL/L (ref 3.3–5.1)
PROT SERPL-MCNC: 7 G/DL (ref 5.9–8.4)
RBC # BLD AUTO: 4.79 M/UL (ref 3.7–5.4)
RHEUMATOID FACT SER QL: <5 IU/ML
SODIUM SERPL-SCNC: 138 MMOL/L (ref 136–144)
TSH SERPL-ACNC: 3.04 UIU/ML (ref 0.45–5.33)
WBC # BLD AUTO: 11.7 K/UL (ref 4–11)

## 2017-08-15 PROCEDURE — 85652 RBC SED RATE AUTOMATED: CPT

## 2017-08-15 PROCEDURE — 73030 X-RAY EXAM OF SHOULDER: CPT | Performed by: INTERNAL MEDICINE

## 2017-08-15 PROCEDURE — 80053 COMPREHEN METABOLIC PANEL: CPT

## 2017-08-15 PROCEDURE — 86140 C-REACTIVE PROTEIN: CPT

## 2017-08-15 PROCEDURE — 86431 RHEUMATOID FACTOR QUANT: CPT

## 2017-08-15 PROCEDURE — 86200 CCP ANTIBODY: CPT

## 2017-08-15 PROCEDURE — 86160 COMPLEMENT ANTIGEN: CPT

## 2017-08-15 PROCEDURE — 73565 X-RAY EXAM OF KNEES: CPT | Performed by: INTERNAL MEDICINE

## 2017-08-15 PROCEDURE — 36415 COLL VENOUS BLD VENIPUNCTURE: CPT

## 2017-08-15 PROCEDURE — 86747 PARVOVIRUS ANTIBODY: CPT

## 2017-08-15 PROCEDURE — 84443 ASSAY THYROID STIM HORMONE: CPT

## 2017-08-15 PROCEDURE — 86038 ANTINUCLEAR ANTIBODIES: CPT

## 2017-08-15 PROCEDURE — 85027 COMPLETE CBC AUTOMATED: CPT

## 2017-08-15 PROCEDURE — 73130 X-RAY EXAM OF HAND: CPT | Performed by: INTERNAL MEDICINE

## 2017-08-15 NOTE — TELEPHONE ENCOUNTER
Patient would like to speak to Dr Kiara Lopez, Did not specify the reason   Patient will be traveling to her country on Monday 8/21  Please advs

## 2017-08-16 LAB
CCP IGG SERPL-ACNC: 0.8 U/ML (ref 0–6.9)
PARVOVIRUS B19 ANTIBODY IGG: 0.82 IV
PARVOVIRUS B19 ANTIBODY IGM: 0.16 IV

## 2017-08-16 NOTE — TELEPHONE ENCOUNTER
Patient is going out of country on Monday [place]   She wants to know the results of test and also if she needs to bring  Any additional medication on the plane.

## 2017-08-17 ENCOUNTER — TELEPHONE (OUTPATIENT)
Dept: RHEUMATOLOGY | Facility: CLINIC | Age: 57
End: 2017-08-17

## 2017-08-17 LAB — ANA SER QL: NEGATIVE

## 2017-08-17 NOTE — TELEPHONE ENCOUNTER
Please call pt and schedule follow up appt as requested per provider. May use \"RN only\" if needed.

## 2017-09-18 ENCOUNTER — TELEPHONE (OUTPATIENT)
Dept: PODIATRY CLINIC | Facility: CLINIC | Age: 57
End: 2017-09-18

## 2017-09-18 ENCOUNTER — OFFICE VISIT (OUTPATIENT)
Dept: OPTOMETRY | Facility: CLINIC | Age: 57
End: 2017-09-18

## 2017-09-18 DIAGNOSIS — E11.9 CONTROLLED TYPE 2 DIABETES MELLITUS WITHOUT COMPLICATION, WITHOUT LONG-TERM CURRENT USE OF INSULIN (HCC): Primary | ICD-10-CM

## 2017-09-18 DIAGNOSIS — H25.13 AGE-RELATED NUCLEAR CATARACT OF BOTH EYES: ICD-10-CM

## 2017-09-18 PROCEDURE — 92014 COMPRE OPH EXAM EST PT 1/>: CPT | Performed by: OPTOMETRIST

## 2017-09-18 NOTE — PATIENT INSTRUCTIONS
Age related cataract  No treatment is required. Will continue to observe.     Controlled type 2 diabetes mellitus without complication, without long-term current use of insulin ([address]  I advised patient that there is no background diabetic retinopathy in eit

## 2017-09-18 NOTE — TELEPHONE ENCOUNTER
Called pt LMOM that we are no longer accepting [employer] insurance starting 10/1/2017. [facility] can [facility] at [phone] to help assist them in finding an in network doctor.

## 2017-09-18 NOTE — PROGRESS NOTES
Bishop Avila is a 59year old female. HPI:     HPI     Diabetic Eye Exam   Diabetes characteristics include Type 2, controlled with diet and taking oral medications. Duration of 10 years.            Comments   Patient is in for an annual diabetic eye Visual Acuity (Snellen - Linear)       Right Left    Dist cc 20/25 20/25    Correction:  Glasses          Tonometry (Non-contact air puff, 11:48 AM)       Right Left    Pressure 18 20          Pupils       Pupils    Right PERRL    Left PERRL          Vi directed. I stressed the importance of yearly diabetic eye exams. Patient has been advised to call immediately if they notice any changes or problems with their vision       No orders of the defined types were placed in this encounter.       Meds This Visit

## 2017-09-25 ENCOUNTER — OFFICE VISIT (OUTPATIENT)
Dept: PODIATRY CLINIC | Facility: CLINIC | Age: 57
End: 2017-09-25

## 2017-09-25 DIAGNOSIS — E11.9 CONTROLLED TYPE 2 DIABETES MELLITUS WITHOUT COMPLICATION, WITHOUT LONG-TERM CURRENT USE OF INSULIN (HCC): Primary | ICD-10-CM

## 2017-09-25 PROCEDURE — 99212 OFFICE O/P EST SF 10 MIN: CPT | Performed by: PODIATRIST

## 2017-09-25 PROCEDURE — 99243 OFF/OP CNSLTJ NEW/EST LOW 30: CPT | Performed by: PODIATRIST

## 2017-09-25 NOTE — PROGRESS NOTES
HPI:    Patient ID: Jaguar Juares is a 59year old female. HPI  This pleasant 68-year-old female presents as a new patient to me on consult from . She states that she is here for a diabetic foot evaluation.   She states that she has been a diabe understanding of my instructions. Plan follow-up in 1 year but she was encouraged to call immediately with any foot related concerns.          ASSESSMENT/PLAN:   Controlled type 2 diabetes mellitus without complication, without long-term current use of ins

## 2017-12-18 RX ORDER — GLIMEPIRIDE 4 MG/1
TABLET ORAL
Qty: 90 TABLET | Refills: 1 | Status: SHIPPED | OUTPATIENT
Start: 2017-12-18 | End: 2018-02-22

## 2018-02-06 ENCOUNTER — OFFICE VISIT (OUTPATIENT)
Dept: FAMILY MEDICINE CLINIC | Facility: CLINIC | Age: 58
End: 2018-02-06

## 2018-02-06 VITALS
DIASTOLIC BLOOD PRESSURE: 79 MMHG | BODY MASS INDEX: 32.32 KG/M2 | HEIGHT: 58 IN | WEIGHT: 154 LBS | SYSTOLIC BLOOD PRESSURE: 128 MMHG | TEMPERATURE: 99 F | HEART RATE: 94 BPM

## 2018-02-06 DIAGNOSIS — Z12.31 ENCOUNTER FOR SCREENING MAMMOGRAM FOR BREAST CANCER: ICD-10-CM

## 2018-02-06 DIAGNOSIS — E78.00 HYPERCHOLESTEREMIA: ICD-10-CM

## 2018-02-06 DIAGNOSIS — Z12.11 COLON CANCER SCREENING: ICD-10-CM

## 2018-02-06 DIAGNOSIS — E11.319 CONTROLLED TYPE 2 DIABETES MELLITUS WITH RETINOPATHY, WITHOUT LONG-TERM CURRENT USE OF INSULIN, MACULAR EDEMA PRESENCE UNSPECIFIED, UNSPECIFIED LATERALITY, UNSPECIFIED RETINOPATHY SEVERITY (HCC): Primary | ICD-10-CM

## 2018-02-06 PROCEDURE — 99212 OFFICE O/P EST SF 10 MIN: CPT | Performed by: FAMILY MEDICINE

## 2018-02-06 PROCEDURE — 99214 OFFICE O/P EST MOD 30 MIN: CPT | Performed by: FAMILY MEDICINE

## 2018-02-06 NOTE — PROGRESS NOTES
HPI:    Patient ID: Rosendo Ortega is a 59year old female. Shoulder Pain    Pertinent negatives include no fever. Diabetes   She presents for her follow-up diabetic visit. She has type 2 diabetes mellitus. Hypoglycemia symptoms include hunger.  Pertin BREAKFAST Disp: 90 tablet Rfl: 1   METFORMIN HCL 1000 MG Oral Tab TAKE 1 TABLET BY MOUTH TWICE A DAY Disp: 180 tablet Rfl: 1   TraMADol HCl 50 MG Oral Tab Take 1 tablet (50 mg total) by mouth every 8 (eight) hours as needed for Pain.  Disp: 40 tablet Rfl: 0 Future    2. Hypercholesteremia  NOT ON ANY STATIN  - COMP METABOLIC PANEL (14); Future  - LIPID PANEL; Future    3. Encounter for screening mammogram for breast cancer    - DONAL SCREENING BILAT (CPT=77067); Future    4.  Colon cancer screening    - OCCULT B

## 2018-02-14 ENCOUNTER — HOSPITAL ENCOUNTER (OUTPATIENT)
Dept: MAMMOGRAPHY | Age: 58
Discharge: HOME | End: 2018-02-14
Attending: FAMILY MEDICINE
Payer: MEDICAID

## 2018-02-14 DIAGNOSIS — Z12.31 ENCOUNTER FOR SCREENING MAMMOGRAM FOR BREAST CANCER: ICD-10-CM

## 2018-02-14 PROCEDURE — 77067 SCR MAMMO BI INCL CAD: CPT | Performed by: FAMILY MEDICINE

## 2018-02-21 ENCOUNTER — APPOINTMENT (OUTPATIENT)
Dept: LAB | Age: 58
End: 2018-02-21
Attending: FAMILY MEDICINE
Payer: MEDICAID

## 2018-02-21 DIAGNOSIS — E11.319 CONTROLLED TYPE 2 DIABETES MELLITUS WITH RETINOPATHY, WITHOUT LONG-TERM CURRENT USE OF INSULIN, MACULAR EDEMA PRESENCE UNSPECIFIED, UNSPECIFIED LATERALITY, UNSPECIFIED RETINOPATHY SEVERITY (HCC): ICD-10-CM

## 2018-02-21 DIAGNOSIS — E78.00 HYPERCHOLESTEREMIA: ICD-10-CM

## 2018-02-21 LAB
ALBUMIN SERPL BCP-MCNC: 3.9 G/DL (ref 3.5–4.8)
ALBUMIN/GLOB SERPL: 1.1 {RATIO} (ref 1–2)
ALP SERPL-CCNC: 72 U/L (ref 32–100)
ALT SERPL-CCNC: 38 U/L (ref 14–54)
ANION GAP SERPL CALC-SCNC: 9 MMOL/L (ref 0–18)
AST SERPL-CCNC: 32 U/L (ref 15–41)
BILIRUB SERPL-MCNC: 0.6 MG/DL (ref 0.3–1.2)
BUN SERPL-MCNC: 8 MG/DL (ref 8–20)
BUN/CREAT SERPL: 12.1 (ref 10–20)
CALCIUM SERPL-MCNC: 9.8 MG/DL (ref 8.5–10.5)
CHLORIDE SERPL-SCNC: 103 MMOL/L (ref 95–110)
CHOLEST SERPL-MCNC: 217 MG/DL (ref 110–200)
CO2 SERPL-SCNC: 26 MMOL/L (ref 22–32)
CREAT SERPL-MCNC: 0.66 MG/DL (ref 0.5–1.5)
CREAT UR-MCNC: 167.3 MG/DL
GLOBULIN PLAS-MCNC: 3.5 G/DL (ref 2.5–3.7)
GLUCOSE SERPL-MCNC: 151 MG/DL (ref 70–99)
HBA1C MFR BLD: 8 % (ref 4–6)
HDLC SERPL-MCNC: 61 MG/DL
LDLC SERPL CALC-MCNC: 127 MG/DL (ref 0–99)
MICROALBUMIN UR-MCNC: 3.9 MG/DL (ref 0–1.8)
MICROALBUMIN/CREAT UR: 23.3 MG/G{CREAT} (ref 0–20)
NONHDLC SERPL-MCNC: 156 MG/DL
OSMOLALITY UR CALC.SUM OF ELEC: 287 MOSM/KG (ref 275–295)
PATIENT FASTING: YES
POTASSIUM SERPL-SCNC: 4 MMOL/L (ref 3.3–5.1)
PROT SERPL-MCNC: 7.4 G/DL (ref 5.9–8.4)
SODIUM SERPL-SCNC: 138 MMOL/L (ref 136–144)
TRIGL SERPL-MCNC: 144 MG/DL (ref 1–149)
TSH SERPL-ACNC: 2.41 UIU/ML (ref 0.45–5.33)

## 2018-02-21 PROCEDURE — 80061 LIPID PANEL: CPT

## 2018-02-21 PROCEDURE — 82570 ASSAY OF URINE CREATININE: CPT

## 2018-02-21 PROCEDURE — 36415 COLL VENOUS BLD VENIPUNCTURE: CPT

## 2018-02-21 PROCEDURE — 84443 ASSAY THYROID STIM HORMONE: CPT

## 2018-02-21 PROCEDURE — 80053 COMPREHEN METABOLIC PANEL: CPT

## 2018-02-21 PROCEDURE — 83036 HEMOGLOBIN GLYCOSYLATED A1C: CPT

## 2018-02-21 PROCEDURE — 82043 UR ALBUMIN QUANTITATIVE: CPT

## 2018-02-22 ENCOUNTER — TELEPHONE (OUTPATIENT)
Dept: FAMILY MEDICINE CLINIC | Facility: CLINIC | Age: 58
End: 2018-02-22

## 2018-02-22 ENCOUNTER — OFFICE VISIT (OUTPATIENT)
Dept: FAMILY MEDICINE CLINIC | Facility: CLINIC | Age: 58
End: 2018-02-22

## 2018-02-22 VITALS
TEMPERATURE: 97 F | HEART RATE: 96 BPM | SYSTOLIC BLOOD PRESSURE: 118 MMHG | DIASTOLIC BLOOD PRESSURE: 78 MMHG | WEIGHT: 154 LBS | HEIGHT: 58 IN | BODY MASS INDEX: 32.32 KG/M2

## 2018-02-22 DIAGNOSIS — Z00.00 WELL ADULT EXAM: Primary | ICD-10-CM

## 2018-02-22 DIAGNOSIS — Z01.419 ENCOUNTER FOR GYNECOLOGICAL EXAMINATION: ICD-10-CM

## 2018-02-22 DIAGNOSIS — Z12.11 COLON CANCER SCREENING: ICD-10-CM

## 2018-02-22 DIAGNOSIS — E78.00 HYPERCHOLESTEREMIA: ICD-10-CM

## 2018-02-22 DIAGNOSIS — M25.50 POLYARTHRALGIA: ICD-10-CM

## 2018-02-22 DIAGNOSIS — E11.9 CONTROLLED TYPE 2 DIABETES MELLITUS WITHOUT COMPLICATION, WITHOUT LONG-TERM CURRENT USE OF INSULIN (HCC): ICD-10-CM

## 2018-02-22 DIAGNOSIS — R92.2 DENSE BREAST TISSUE ON MAMMOGRAM: ICD-10-CM

## 2018-02-22 DIAGNOSIS — E66.9 OBESITY (BMI 30.0-34.9): ICD-10-CM

## 2018-02-22 PROBLEM — R92.30 DENSE BREAST TISSUE ON MAMMOGRAM: Status: ACTIVE | Noted: 2018-02-22

## 2018-02-22 PROCEDURE — 99396 PREV VISIT EST AGE 40-64: CPT | Performed by: FAMILY MEDICINE

## 2018-02-22 RX ORDER — GLIMEPIRIDE 4 MG/1
4 TABLET ORAL
COMMUNITY
End: 2018-07-12

## 2018-02-22 NOTE — PROGRESS NOTES
HPI:   Sandra Del Cid is a 59year old female who presents for a complete physical exam. Symptoms: is menopausal.     Wt Readings from Last 6 Encounters:  02/22/18 : 154 lb (69.9 kg)  02/06/18 : 154 lb (69.9 kg)  08/08/17 : 157 lb 3.2 oz (71.3 kg)  07/06/1 Father    • Diabetes Mother    • Diabetes Brother    • Hypertension Brother    • Breast Cancer Paternal Aunt 79   • Breast Cancer Paternal Cousin Female 36   • Cancer Other      cousin, breast cancer   • Glaucoma Neg       Social History:   Smoking status: rashes,no suspicious lesions  HEENT: atraumatic, normocephalic,ears and throat are clear  EYES:PERRLA, EOMI,conjunctiva are clear  NECK: supple, no adenopathy, no bruits  CHEST: no chest tenderness  BREAST: no dominant or suspicious mass  LUNGS: clear to a home and will try and let me know which one    5. Controlled type 2 diabetes mellitus without complication, without long-term current use of insulin (HCC)  See endo  - ENDOCRINOLOGY - INTERNAL    6.  Colon cancer screening  Declines colonoscopy  - OCCULT BL

## 2018-02-22 NOTE — TELEPHONE ENCOUNTER
Spouse said pt is taking Lovastatin 10 mg for her cholesterol     Pt saw Dr Peyton Ervin today-was to callback to provide this info

## 2018-02-23 NOTE — TELEPHONE ENCOUNTER
please see pt message below and advise. Are you keeping her on this medication? LMTCB please transfer to triage. I just wanted to inform pt that  will be in on Monday. Does pt need medication now?

## 2018-02-25 RX ORDER — LOVASTATIN 10 MG/1
10 TABLET ORAL NIGHTLY
COMMUNITY
End: 2018-02-26

## 2018-02-26 PROBLEM — IMO0001 UNCONTROLLED TYPE 2 DIABETES MELLITUS WITHOUT COMPLICATION, WITHOUT LONG-TERM CURRENT USE OF INSULIN: Status: ACTIVE | Noted: 2017-02-08

## 2018-04-10 ENCOUNTER — OFFICE VISIT (OUTPATIENT)
Dept: RHEUMATOLOGY | Facility: CLINIC | Age: 58
End: 2018-04-10

## 2018-04-10 VITALS
SYSTOLIC BLOOD PRESSURE: 144 MMHG | TEMPERATURE: 98 F | WEIGHT: 152.13 LBS | HEIGHT: 58 IN | HEART RATE: 90 BPM | DIASTOLIC BLOOD PRESSURE: 79 MMHG | BODY MASS INDEX: 31.93 KG/M2

## 2018-04-10 DIAGNOSIS — M15.9 PRIMARY OSTEOARTHRITIS INVOLVING MULTIPLE JOINTS: Primary | ICD-10-CM

## 2018-04-10 DIAGNOSIS — M67.912 DISORDER OF LEFT ROTATOR CUFF: ICD-10-CM

## 2018-04-10 PROBLEM — M15.0 PRIMARY OSTEOARTHRITIS INVOLVING MULTIPLE JOINTS: Status: ACTIVE | Noted: 2018-04-10

## 2018-04-10 PROCEDURE — 99213 OFFICE O/P EST LOW 20 MIN: CPT | Performed by: INTERNAL MEDICINE

## 2018-04-10 PROCEDURE — 20610 DRAIN/INJ JOINT/BURSA W/O US: CPT | Performed by: INTERNAL MEDICINE

## 2018-04-10 PROCEDURE — 99212 OFFICE O/P EST SF 10 MIN: CPT | Performed by: INTERNAL MEDICINE

## 2018-04-10 RX ORDER — METHYLPREDNISOLONE ACETATE 40 MG/ML
40 INJECTION, SUSPENSION INTRA-ARTICULAR; INTRALESIONAL; INTRAMUSCULAR; SOFT TISSUE ONCE
Status: COMPLETED | OUTPATIENT
Start: 2018-04-10 | End: 2018-04-10

## 2018-04-10 RX ADMIN — METHYLPREDNISOLONE ACETATE 40 MG: 40 INJECTION, SUSPENSION INTRA-ARTICULAR; INTRALESIONAL; INTRAMUSCULAR; SOFT TISSUE at 10:39:00

## 2018-04-10 NOTE — PROCEDURES
Joint Injection  Pre-procedure care:  Consent was obtained, Procedure/risks were explained, Questions were answered, Patient was prepped and draped in the usual sterile fashion, Correct side and site confirmed and Correct patient identified  Primary osteoa

## 2018-04-10 NOTE — PROGRESS NOTES
Dear Dr. Darnell Ferguson:    I saw your patient Humza Vail in follow-up this morning in my rheumatology clinic. I had seen her in August of 2018 for a consult. She was not able to follow-up because of travel and insurance.     She comes in complaining of pain es be.      Sincerely,      Billy Baxter MD   Rheumatology.

## 2018-04-24 ENCOUNTER — OFFICE VISIT (OUTPATIENT)
Dept: ENDOCRINOLOGY CLINIC | Facility: CLINIC | Age: 58
End: 2018-04-24

## 2018-04-24 VITALS
BODY MASS INDEX: 32.32 KG/M2 | HEIGHT: 58 IN | HEART RATE: 102 BPM | SYSTOLIC BLOOD PRESSURE: 124 MMHG | DIASTOLIC BLOOD PRESSURE: 79 MMHG | WEIGHT: 154 LBS

## 2018-04-24 DIAGNOSIS — IMO0001 UNCONTROLLED TYPE 2 DIABETES MELLITUS WITHOUT COMPLICATION, WITHOUT LONG-TERM CURRENT USE OF INSULIN: Primary | ICD-10-CM

## 2018-04-24 PROCEDURE — 83036 HEMOGLOBIN GLYCOSYLATED A1C: CPT | Performed by: INTERNAL MEDICINE

## 2018-04-24 PROCEDURE — 99244 OFF/OP CNSLTJ NEW/EST MOD 40: CPT | Performed by: INTERNAL MEDICINE

## 2018-04-24 PROCEDURE — 36416 COLLJ CAPILLARY BLOOD SPEC: CPT | Performed by: INTERNAL MEDICINE

## 2018-04-24 PROCEDURE — 99212 OFFICE O/P EST SF 10 MIN: CPT | Performed by: INTERNAL MEDICINE

## 2018-04-24 PROCEDURE — 82962 GLUCOSE BLOOD TEST: CPT | Performed by: INTERNAL MEDICINE

## 2018-04-24 RX ORDER — ALOGLIPTIN 25 MG/1
25 TABLET, FILM COATED ORAL DAILY
Qty: 90 TABLET | Refills: 0 | Status: SHIPPED | OUTPATIENT
Start: 2018-04-24 | End: 2018-08-20

## 2018-04-24 NOTE — H&P
New Patient Visit - Diabetes    CONSULT - Reason for Visit:  Diabetes management  Requesting Physician: Dr. Tamela Muller:  Patient presents with:  Consult  Diabetes  Osteoporosis       HISTORY OF PRESENT ILLNESS:   Joey Gilbert is a 59 year ol status:              Spouse name:                       Years of education:                 Number of children:               Social History Main Topics    Smoking status: Never Smoker                                                                S tenderness,  LUNGS: clear to auscultation bilaterally, no crackles or wheezing  CARDIOVASCULAR:  regular rate and rhythm, normal S1 and S2  ABDOMEN:  normal bowel sounds, soft, non-distended, non-tender  SKIN:  no bruising or bleeding, no rashes and no les Finger stick glucose [51163]      POC Glycohemoglobin [17497]      4/24/2018  Tia Melara MD

## 2018-05-07 NOTE — TELEPHONE ENCOUNTER
Pts  states that the pt. Needs to get a Rx for a new Glucose machine, as the Pt has lost her machine.

## 2018-06-21 ENCOUNTER — OFFICE VISIT (OUTPATIENT)
Dept: FAMILY MEDICINE CLINIC | Facility: CLINIC | Age: 58
End: 2018-06-21

## 2018-06-21 VITALS
SYSTOLIC BLOOD PRESSURE: 126 MMHG | BODY MASS INDEX: 31.91 KG/M2 | TEMPERATURE: 99 F | HEART RATE: 80 BPM | HEIGHT: 58 IN | WEIGHT: 152 LBS | DIASTOLIC BLOOD PRESSURE: 80 MMHG

## 2018-06-21 DIAGNOSIS — R05.9 COUGH: ICD-10-CM

## 2018-06-21 DIAGNOSIS — J02.9 SORE THROAT: Primary | ICD-10-CM

## 2018-06-21 DIAGNOSIS — E53.8 B12 DEFICIENCY: ICD-10-CM

## 2018-06-21 PROCEDURE — 99212 OFFICE O/P EST SF 10 MIN: CPT | Performed by: FAMILY MEDICINE

## 2018-06-21 PROCEDURE — 99213 OFFICE O/P EST LOW 20 MIN: CPT | Performed by: FAMILY MEDICINE

## 2018-06-21 PROCEDURE — 87880 STREP A ASSAY W/OPTIC: CPT | Performed by: FAMILY MEDICINE

## 2018-06-21 RX ORDER — MELATONIN
1000 DAILY
Qty: 90 TABLET | Refills: 0 | Status: SHIPPED | OUTPATIENT
Start: 2018-06-21 | End: 2018-10-30

## 2018-06-21 NOTE — PROGRESS NOTES
HPI:   Thom Mays is a 59year old female who presents for upper respiratory symptoms for    Patient presents with:  Sore Throat: X 2 days  Runny Nose  Cough: with pleghm  Weakness          Current Outpatient Prescriptions:  The Online 401 CONTOUR NEXT TEST In headaches    EXAM:   /80   Pulse 80   Temp 98.7 °F (37.1 °C) (Oral)   Ht 4' 10\" (1.473 m)   Wt 152 lb (68.9 kg)   BMI 31.77 kg/m²   GENERAL: well developed, well nourished,in no apparent distress  SKIN: no rashes,no suspicious lesions  EYES:PERRLA,

## 2018-06-22 ENCOUNTER — TELEPHONE (OUTPATIENT)
Dept: FAMILY MEDICINE CLINIC | Facility: CLINIC | Age: 58
End: 2018-06-22

## 2018-06-22 NOTE — TELEPHONE ENCOUNTER
Patient was seen by Dr. Jimy Meraz on 06/21/2018 for sore throat and Spouse calling to see if Dr. Jimy Meraz was going to prescribe anything.      Please advise

## 2018-06-25 NOTE — TELEPHONE ENCOUNTER
Closing encounter as pt has not returned call. Strep in office was negative and advice was to rest and push fluids at that time. If pt calls back transfer to triage so we can assess her further.

## 2018-07-12 ENCOUNTER — TELEPHONE (OUTPATIENT)
Dept: ENDOCRINOLOGY CLINIC | Facility: CLINIC | Age: 58
End: 2018-07-12

## 2018-07-13 RX ORDER — LANCETS
EACH MISCELLANEOUS
Qty: 200 EACH | Refills: 0 | Status: SHIPPED | OUTPATIENT
Start: 2018-07-13

## 2018-07-13 RX ORDER — BLOOD SUGAR DIAGNOSTIC
STRIP MISCELLANEOUS
Qty: 200 STRIP | Refills: 0 | Status: SHIPPED | OUTPATIENT
Start: 2018-07-13

## 2018-07-13 NOTE — TELEPHONE ENCOUNTER
Please advise on refill request.   Protocol denied due to no recent HgbA1C     Diabetes Medications  Protocol Criteria:  · Appointment scheduled in the past 6 months or the next 3 months  · A1C < 7.5 in the past 6 months  · Creatinine in the past 12 months

## 2018-07-15 RX ORDER — GLIMEPIRIDE 4 MG/1
TABLET ORAL
Qty: 90 TABLET | Refills: 0 | Status: SHIPPED | OUTPATIENT
Start: 2018-07-15 | End: 2018-10-30

## 2018-08-21 RX ORDER — ALOGLIPTIN 25 MG/1
TABLET, FILM COATED ORAL
Qty: 30 TABLET | Refills: 0 | Status: SHIPPED | OUTPATIENT
Start: 2018-08-21 | End: 2018-10-22

## 2018-08-21 NOTE — TELEPHONE ENCOUNTER
LOV 4/24/18. RTC 3 months. No F/U scheduled. Called the patient. Offered to help her schedule an appt but she is out of town at the time. She will call back later to schedule. 1 month refill pending.

## 2018-09-27 NOTE — TELEPHONE ENCOUNTER
Current Outpatient Medications:  METFORMIN HCL 1000 MG Oral Tab TAKE 1 TABLET BY MOUTH TWICE DAILY Disp: 180 tablet Rfl: 0

## 2018-09-27 NOTE — TELEPHONE ENCOUNTER
Refill passed per [facility] protocol.     Diabetes Medications  Protocol Criteria:  · Appointment scheduled in the past 6 months or the next 3 months  · A1C < 7.5 in the past 6 months  · Creatinine in the past 12 months  · Creatinine result < 1.5   Re

## 2018-10-23 RX ORDER — ALOGLIPTIN 25 MG/1
TABLET, FILM COATED ORAL
Qty: 30 TABLET | Refills: 0 | Status: SHIPPED | OUTPATIENT
Start: 2018-10-23 | End: 2018-10-30

## 2018-10-23 NOTE — TELEPHONE ENCOUNTER
Sent a my chart message requesting follow up apt/ await apt for refill. LOV 4/2018. Called patient and left detailed message informing her she is due for follow up.  One month supply pending

## 2018-10-30 ENCOUNTER — OFFICE VISIT (OUTPATIENT)
Dept: FAMILY MEDICINE CLINIC | Facility: CLINIC | Age: 58
End: 2018-10-30
Payer: MEDICAID

## 2018-10-30 VITALS
HEART RATE: 83 BPM | BODY MASS INDEX: 31.91 KG/M2 | HEIGHT: 58 IN | TEMPERATURE: 98 F | SYSTOLIC BLOOD PRESSURE: 134 MMHG | WEIGHT: 152 LBS | DIASTOLIC BLOOD PRESSURE: 80 MMHG

## 2018-10-30 DIAGNOSIS — IMO0001 UNCONTROLLED TYPE 2 DIABETES MELLITUS WITHOUT COMPLICATION, WITHOUT LONG-TERM CURRENT USE OF INSULIN: Primary | ICD-10-CM

## 2018-10-30 DIAGNOSIS — N39.0 RECURRENT UTI: ICD-10-CM

## 2018-10-30 DIAGNOSIS — Z23 NEED FOR INFLUENZA VACCINATION: ICD-10-CM

## 2018-10-30 DIAGNOSIS — E78.00 HYPERCHOLESTEREMIA: ICD-10-CM

## 2018-10-30 PROCEDURE — 99212 OFFICE O/P EST SF 10 MIN: CPT | Performed by: FAMILY MEDICINE

## 2018-10-30 PROCEDURE — 90686 IIV4 VACC NO PRSV 0.5 ML IM: CPT | Performed by: FAMILY MEDICINE

## 2018-10-30 PROCEDURE — 90471 IMMUNIZATION ADMIN: CPT | Performed by: FAMILY MEDICINE

## 2018-10-30 PROCEDURE — 99214 OFFICE O/P EST MOD 30 MIN: CPT | Performed by: FAMILY MEDICINE

## 2018-10-30 RX ORDER — LOVASTATIN 10 MG/1
10 TABLET ORAL NIGHTLY
Qty: 90 TABLET | Refills: 3 | Status: SHIPPED | OUTPATIENT
Start: 2018-10-30 | End: 2019-05-02

## 2018-10-30 RX ORDER — GLIMEPIRIDE 4 MG/1
TABLET ORAL
Qty: 90 TABLET | Refills: 3 | Status: SHIPPED | OUTPATIENT
Start: 2018-10-30 | End: 2019-07-15

## 2018-10-30 RX ORDER — MELATONIN
1000 DAILY
Qty: 90 TABLET | Refills: 0 | Status: SHIPPED | OUTPATIENT
Start: 2018-10-30

## 2018-10-30 RX ORDER — SULFAMETHOXAZOLE AND TRIMETHOPRIM 800; 160 MG/1; MG/1
1 TABLET ORAL 2 TIMES DAILY
Qty: 6 TABLET | Refills: 1 | Status: SHIPPED | OUTPATIENT
Start: 2018-10-30 | End: 2018-11-02

## 2018-10-30 RX ORDER — ALOGLIPTIN 25 MG/1
1 TABLET, FILM COATED ORAL
Qty: 90 TABLET | Refills: 3 | Status: SHIPPED | OUTPATIENT
Start: 2018-10-30

## 2018-10-30 NOTE — PROGRESS NOTES
HPI:    Patient ID: Jaelyn Reed is a 59year old female. HPI     Patient here for follow-up on her diabetes and also needing refills and blood work. She states she had her  relocated with her son to [facility] about 4 months ago.   She does Disp: 90 tablet Rfl: 3   Lovastatin 10 MG Oral Tab Take 1 tablet (10 mg total) by mouth nightly. Disp: 90 tablet Rfl: 3   Vitamin B-12 1000 MCG Oral Tab Take 1 tablet (1,000 mcg total) by mouth daily.  Disp: 90 tablet Rfl: 0   Sulfamethoxazole-TMP -16 diabetes mellitus without complication, without long-term current use of insulin (HCC)    - HEMOGLOBIN A1C; Future    2. Hypercholesteremia    - LIPID PANEL; Future  - COMP METABOLIC PANEL (14); Future    3.  Need for influenza vaccination    - FLULAVAL INF

## 2018-11-05 ENCOUNTER — APPOINTMENT (OUTPATIENT)
Dept: LAB | Age: 58
End: 2018-11-05
Attending: FAMILY MEDICINE
Payer: MEDICAID

## 2018-11-05 DIAGNOSIS — N39.0 RECURRENT UTI: ICD-10-CM

## 2018-11-05 DIAGNOSIS — E78.00 HYPERCHOLESTEREMIA: ICD-10-CM

## 2018-11-05 DIAGNOSIS — IMO0001 UNCONTROLLED TYPE 2 DIABETES MELLITUS WITHOUT COMPLICATION, WITHOUT LONG-TERM CURRENT USE OF INSULIN: ICD-10-CM

## 2018-11-05 PROCEDURE — 87086 URINE CULTURE/COLONY COUNT: CPT

## 2018-11-05 PROCEDURE — 83036 HEMOGLOBIN GLYCOSYLATED A1C: CPT

## 2018-11-05 PROCEDURE — 36415 COLL VENOUS BLD VENIPUNCTURE: CPT

## 2018-11-05 PROCEDURE — 80053 COMPREHEN METABOLIC PANEL: CPT

## 2018-11-05 PROCEDURE — 80061 LIPID PANEL: CPT

## 2019-02-28 RX ORDER — LANCETS
EACH MISCELLANEOUS
Qty: 200 EACH | Refills: 0 | Status: SHIPPED | OUTPATIENT
Start: 2019-02-28

## 2019-02-28 RX ORDER — BLOOD SUGAR DIAGNOSTIC
STRIP MISCELLANEOUS
Qty: 200 STRIP | Refills: 0 | Status: SHIPPED | OUTPATIENT
Start: 2019-02-28

## 2019-03-01 RX ORDER — GLIMEPIRIDE 4 MG/1
TABLET ORAL
Qty: 90 TABLET | Refills: 0 | Status: SHIPPED | OUTPATIENT
Start: 2019-03-01 | End: 2019-05-02

## 2019-05-02 ENCOUNTER — OFFICE VISIT (OUTPATIENT)
Dept: FAMILY MEDICINE CLINIC | Facility: CLINIC | Age: 59
End: 2019-05-02
Payer: MEDICAID

## 2019-05-02 ENCOUNTER — APPOINTMENT (OUTPATIENT)
Dept: LAB | Age: 59
End: 2019-05-02
Attending: FAMILY MEDICINE
Payer: MEDICAID

## 2019-05-02 VITALS
SYSTOLIC BLOOD PRESSURE: 112 MMHG | BODY MASS INDEX: 31.91 KG/M2 | DIASTOLIC BLOOD PRESSURE: 75 MMHG | TEMPERATURE: 98 F | HEIGHT: 58 IN | WEIGHT: 152 LBS | HEART RATE: 84 BPM

## 2019-05-02 DIAGNOSIS — IMO0001 UNCONTROLLED TYPE 2 DIABETES MELLITUS WITHOUT COMPLICATION, WITHOUT LONG-TERM CURRENT USE OF INSULIN: Primary | ICD-10-CM

## 2019-05-02 DIAGNOSIS — J02.9 SORE THROAT: ICD-10-CM

## 2019-05-02 DIAGNOSIS — IMO0001 UNCONTROLLED TYPE 2 DIABETES MELLITUS WITHOUT COMPLICATION, WITHOUT LONG-TERM CURRENT USE OF INSULIN: ICD-10-CM

## 2019-05-02 DIAGNOSIS — E78.00 HYPERCHOLESTEREMIA: ICD-10-CM

## 2019-05-02 DIAGNOSIS — E66.9 OBESITY (BMI 30-39.9): ICD-10-CM

## 2019-05-02 PROCEDURE — 82043 UR ALBUMIN QUANTITATIVE: CPT

## 2019-05-02 PROCEDURE — 99214 OFFICE O/P EST MOD 30 MIN: CPT | Performed by: FAMILY MEDICINE

## 2019-05-02 PROCEDURE — 82570 ASSAY OF URINE CREATININE: CPT

## 2019-05-02 PROCEDURE — 36415 COLL VENOUS BLD VENIPUNCTURE: CPT

## 2019-05-02 PROCEDURE — 80061 LIPID PANEL: CPT

## 2019-05-02 PROCEDURE — 83036 HEMOGLOBIN GLYCOSYLATED A1C: CPT

## 2019-05-02 PROCEDURE — 80053 COMPREHEN METABOLIC PANEL: CPT

## 2019-05-02 PROCEDURE — 84443 ASSAY THYROID STIM HORMONE: CPT

## 2019-05-02 PROCEDURE — 87880 STREP A ASSAY W/OPTIC: CPT | Performed by: FAMILY MEDICINE

## 2019-05-02 PROCEDURE — 99212 OFFICE O/P EST SF 10 MIN: CPT | Performed by: FAMILY MEDICINE

## 2019-05-02 NOTE — PROGRESS NOTES
HPI:    Patient ID: Deedee Garcia is a 59year old female.     HPI  Patient presents with:  Diabetes: follow up   Cholesterol: follow up   Sore Throat: X 3 days   Other: pt states that she fell and hit her head and had to get stitches while in [place] [address] [place] taking any cholesterol meds. Review of Systems   Constitutional: Negative for chills, fatigue, fever and unexpected weight change. Eyes: Negative for visual disturbance. Cardiovascular: Negative for chest pain, palpitations and leg swelling.    Bishop Rich RASH  Pravastatin             TONGUE SWELLING   PHYSICAL EXAM:   Patient presents with:  Diabetes: follow up   Cholesterol: follow up   Sore Throat: X 3 days   Other: pt states that she fell and hit her head and had to get stitches while in [place] 4 months least 30-40 minutes 5-6 days a week. Avoid skipping meals. Making healthy choices for snacks and also limiting sugary beverages.     Follow up based on labs      Orders Placed This Encounter      Comp Metabolic Panel (14) [E]      Lipid Panel [E]      Hem

## 2019-05-06 RX ORDER — LOSARTAN POTASSIUM 25 MG/1
25 TABLET ORAL DAILY
Qty: 90 TABLET | Refills: 0 | Status: SHIPPED | OUTPATIENT
Start: 2019-05-06

## 2019-06-21 RX ORDER — GLIMEPIRIDE 4 MG/1
TABLET ORAL
Qty: 90 TABLET | Refills: 0 | OUTPATIENT
Start: 2019-06-21

## 2019-07-15 ENCOUNTER — NURSE TRIAGE (OUTPATIENT)
Dept: OTHER | Age: 59
End: 2019-07-15

## 2019-07-15 RX ORDER — GLIMEPIRIDE 4 MG/1
TABLET ORAL
Qty: 90 TABLET | Refills: 1 | Status: SHIPPED | OUTPATIENT
Start: 2019-07-15 | End: 2019-09-10

## 2019-07-15 NOTE — TELEPHONE ENCOUNTER
Action Requested: Summary for Provider     []  Critical Lab, Recommendations Needed  [] Need Additional Advice  []   FYI    []   Need Orders  [x] Need Medications Sent to Pharmacy  []  Other     SUMMARY: Judith Daughter Desi Peters verified call to report pt having

## 2019-07-15 NOTE — TELEPHONE ENCOUNTER
Advised daughter Nichole Gallagher of Dr. York Aid note. Daughter verbalized understanding and indicated that patient is currently out of town for a few weeks, but will have patient get evaluated in urgent care where she is at.

## 2019-08-20 ENCOUNTER — TELEPHONE (OUTPATIENT)
Dept: FAMILY MEDICINE CLINIC | Facility: CLINIC | Age: 59
End: 2019-08-20

## 2019-08-20 DIAGNOSIS — E53.8 B12 DEFICIENCY: ICD-10-CM

## 2019-08-20 DIAGNOSIS — E55.9 VITAMIN D DEFICIENCY: ICD-10-CM

## 2019-08-20 DIAGNOSIS — E78.00 HYPERCHOLESTEREMIA: Primary | ICD-10-CM

## 2019-08-20 DIAGNOSIS — IMO0001 UNCONTROLLED TYPE 2 DIABETES MELLITUS WITHOUT COMPLICATION, WITHOUT LONG-TERM CURRENT USE OF INSULIN: ICD-10-CM

## 2019-08-21 RX ORDER — GLIMEPIRIDE 4 MG/1
TABLET ORAL
Qty: 90 TABLET | Refills: 0 | OUTPATIENT
Start: 2019-08-21

## 2019-08-21 NOTE — TELEPHONE ENCOUNTER
Spoke with [facility] and they did not receive the refill for glimepiride 4 mg tab or the Metformin hcl 1000 mg tab. Verbal order given for both medications.  Pharmacy will call patient when medications are ready for .     Jass Betts

## 2019-09-03 ENCOUNTER — TELEPHONE (OUTPATIENT)
Dept: OTHER | Age: 59
End: 2019-09-03

## 2019-09-10 ENCOUNTER — TELEPHONE (OUTPATIENT)
Dept: FAMILY MEDICINE CLINIC | Facility: CLINIC | Age: 59
End: 2019-09-10

## 2019-09-10 RX ORDER — GLIMEPIRIDE 4 MG/1
TABLET ORAL
Qty: 90 TABLET | Refills: 1 | Status: CANCELLED | OUTPATIENT
Start: 2019-09-10

## 2019-09-10 RX ORDER — GLIMEPIRIDE 4 MG/1
TABLET ORAL
Qty: 90 TABLET | Refills: 0 | Status: CANCELLED | OUTPATIENT
Start: 2019-09-10

## 2019-09-10 RX ORDER — GLIMEPIRIDE 4 MG/1
TABLET ORAL
Qty: 90 TABLET | Refills: 0 | Status: SHIPPED | OUTPATIENT
Start: 2019-09-10

## 2019-09-10 NOTE — TELEPHONE ENCOUNTER
Daughter, Adarsh Bagley, is asking for Dr. Monte Call to call, when available. Pt has a few questions and would like to ask PCP directly. Please advise.

## 2019-09-11 NOTE — TELEPHONE ENCOUNTER
Already addressed  Patient in [place]. There has been some insurance issues. She would like a short-term refill and will get her blood work done after and follow-up with me. Prescription sent to [facility] in [place].

## 2019-09-11 NOTE — PROGRESS NOTES
Patient called and states she is in the [facility] currently with her son. There was some insurance issues and so she was not able to get her blood work done.   She is requesting a refill to be sent to [facility] in [place] while she looks into getting her

## 2019-09-11 NOTE — TELEPHONE ENCOUNTER
Medications sent to [facility] in [place]. Patient currently living there. She had some insurance issues and will get blood work done as soon as she has insurance. Medication sent for 3 months.

## 2019-12-17 ENCOUNTER — TELEPHONE (OUTPATIENT)
Dept: FAMILY MEDICINE CLINIC | Facility: CLINIC | Age: 59
End: 2019-12-17

## 2019-12-17 RX ORDER — GLIMEPIRIDE 4 MG/1
TABLET ORAL
Qty: 90 TABLET | Refills: 0 | OUTPATIENT
Start: 2019-12-17

## 2019-12-17 NOTE — TELEPHONE ENCOUNTER
Judith called in stating that patient is out of medications below. Confirmed pharmacy on file. Please advise. Current Outpatient Medications:   •  metFORMIN HCl 1000 MG Oral Tab, Take 1 tablet (1,000 mg total) by mouth 2 (two) times daily. , Disp:

## 2019-12-17 NOTE — TELEPHONE ENCOUNTER
Please advise on refill request.     Diabetes Medications  Protocol Criteria:  · Appointment scheduled in the past 6 months or the next 3 months  · A1C < 7.5 in the past 6 months  · Creatinine in the past 12 months  · Creatinine result < 1.5   Recent Outpa

## 2019-12-18 NOTE — TELEPHONE ENCOUNTER
Contacted the daughter (CARSON confirmed). Advised that Dr. Monte Call was under the impression that patient was living in [place] and was going to establish care with a provider there. It is unclear if this is the case, daughter was very vague.     Daughter stat

## 2019-12-19 NOTE — TELEPHONE ENCOUNTER
Called patient. She is currently living in [place]. Patient states her insurance ran out and she does not have any insurance right now. She is not able to get insurance in [place].   She states her son's physician friend currently has refilled her medicati

## 2020-05-01 ENCOUNTER — TELEPHONE (OUTPATIENT)
Dept: FAMILY MEDICINE CLINIC | Facility: CLINIC | Age: 60
End: 2020-05-01

## 2022-11-09 ENCOUNTER — OFFICE VISIT (OUTPATIENT)
Dept: FAMILY MEDICINE CLINIC | Facility: CLINIC | Age: 62
End: 2022-11-09
Payer: MEDICAID

## 2022-11-09 VITALS
TEMPERATURE: 97 F | SYSTOLIC BLOOD PRESSURE: 117 MMHG | BODY MASS INDEX: 28.55 KG/M2 | HEIGHT: 57.9 IN | HEART RATE: 79 BPM | DIASTOLIC BLOOD PRESSURE: 73 MMHG | WEIGHT: 136 LBS

## 2022-11-09 DIAGNOSIS — E55.9 VITAMIN D DEFICIENCY: ICD-10-CM

## 2022-11-09 DIAGNOSIS — E66.3 OVERWEIGHT (BMI 25.0-29.9): ICD-10-CM

## 2022-11-09 DIAGNOSIS — R42 DIZZINESS: ICD-10-CM

## 2022-11-09 DIAGNOSIS — M25.50 POLYARTHRALGIA: Primary | ICD-10-CM

## 2022-11-09 DIAGNOSIS — E78.00 HYPERCHOLESTEREMIA: ICD-10-CM

## 2022-11-09 DIAGNOSIS — Z12.31 ENCOUNTER FOR SCREENING MAMMOGRAM FOR BREAST CANCER: ICD-10-CM

## 2022-11-09 DIAGNOSIS — Z12.11 COLON CANCER SCREENING: ICD-10-CM

## 2022-11-09 DIAGNOSIS — E53.8 B12 DEFICIENCY: ICD-10-CM

## 2022-11-09 DIAGNOSIS — Z23 NEED FOR INFLUENZA VACCINATION: ICD-10-CM

## 2022-11-09 DIAGNOSIS — E11.65 UNCONTROLLED TYPE 2 DIABETES MELLITUS WITH HYPERGLYCEMIA (HCC): ICD-10-CM

## 2022-11-09 PROCEDURE — 3074F SYST BP LT 130 MM HG: CPT | Performed by: FAMILY MEDICINE

## 2022-11-09 PROCEDURE — 3008F BODY MASS INDEX DOCD: CPT | Performed by: FAMILY MEDICINE

## 2022-11-09 PROCEDURE — 99396 PREV VISIT EST AGE 40-64: CPT | Performed by: FAMILY MEDICINE

## 2022-11-09 PROCEDURE — 3078F DIAST BP <80 MM HG: CPT | Performed by: FAMILY MEDICINE

## 2022-11-09 PROCEDURE — 90686 IIV4 VACC NO PRSV 0.5 ML IM: CPT | Performed by: FAMILY MEDICINE

## 2022-11-09 PROCEDURE — 90471 IMMUNIZATION ADMIN: CPT | Performed by: FAMILY MEDICINE

## 2022-11-09 RX ORDER — GLIPIZIDE 5 MG/1
TABLET ORAL
COMMUNITY
Start: 2022-07-29 | End: 2022-11-13 | Stop reason: ALTCHOICE

## 2022-11-09 RX ORDER — LISINOPRIL 5 MG/1
5 TABLET ORAL DAILY
COMMUNITY
Start: 2022-07-29 | End: 2022-11-09

## 2022-11-11 ENCOUNTER — LAB ENCOUNTER (OUTPATIENT)
Dept: LAB | Age: 62
End: 2022-11-11
Attending: FAMILY MEDICINE
Payer: MEDICAID

## 2022-11-11 DIAGNOSIS — E78.00 HYPERCHOLESTEREMIA: ICD-10-CM

## 2022-11-11 DIAGNOSIS — E11.65 UNCONTROLLED TYPE 2 DIABETES MELLITUS WITH HYPERGLYCEMIA (HCC): ICD-10-CM

## 2022-11-11 DIAGNOSIS — M25.50 POLYARTHRALGIA: ICD-10-CM

## 2022-11-11 DIAGNOSIS — E55.9 VITAMIN D DEFICIENCY: ICD-10-CM

## 2022-11-11 DIAGNOSIS — E53.8 B12 DEFICIENCY: ICD-10-CM

## 2022-11-11 LAB
ALBUMIN SERPL-MCNC: 3.6 G/DL (ref 3.4–5)
ALBUMIN/GLOB SERPL: 0.9 {RATIO} (ref 1–2)
ALP LIVER SERPL-CCNC: 76 U/L
ALT SERPL-CCNC: 39 U/L
ANION GAP SERPL CALC-SCNC: 10 MMOL/L (ref 0–18)
AST SERPL-CCNC: 31 U/L (ref 15–37)
BASOPHILS # BLD AUTO: 0.09 X10(3) UL (ref 0–0.2)
BASOPHILS NFR BLD AUTO: 1 %
BILIRUB SERPL-MCNC: 0.4 MG/DL (ref 0.1–2)
BUN BLD-MCNC: 14 MG/DL (ref 7–18)
BUN/CREAT SERPL: 21.5 (ref 10–20)
CALCIUM BLD-MCNC: 9.7 MG/DL (ref 8.5–10.1)
CHLORIDE SERPL-SCNC: 106 MMOL/L (ref 98–112)
CHOLEST SERPL-MCNC: 210 MG/DL (ref ?–200)
CO2 SERPL-SCNC: 24 MMOL/L (ref 21–32)
CREAT BLD-MCNC: 0.65 MG/DL
CREAT UR-SCNC: 101 MG/DL
CRP SERPL-MCNC: 0.57 MG/DL (ref ?–0.3)
DEPRECATED RDW RBC AUTO: 41.3 FL (ref 35.1–46.3)
EOSINOPHIL # BLD AUTO: 0.72 X10(3) UL (ref 0–0.7)
EOSINOPHIL NFR BLD AUTO: 8.1 %
ERYTHROCYTE [DISTWIDTH] IN BLOOD BY AUTOMATED COUNT: 13.2 % (ref 11–15)
ERYTHROCYTE [SEDIMENTATION RATE] IN BLOOD: 47 MM/HR
EST. AVERAGE GLUCOSE BLD GHB EST-MCNC: 189 MG/DL (ref 68–126)
FASTING PATIENT LIPID ANSWER: YES
FASTING STATUS PATIENT QL REPORTED: YES
GFR SERPLBLD BASED ON 1.73 SQ M-ARVRAT: 98 ML/MIN/1.73M2 (ref 60–?)
GLOBULIN PLAS-MCNC: 3.9 G/DL (ref 2.8–4.4)
GLUCOSE BLD-MCNC: 148 MG/DL (ref 70–99)
HBA1C MFR BLD: 8.2 % (ref ?–5.7)
HCT VFR BLD AUTO: 39.5 %
HDLC SERPL-MCNC: 78 MG/DL (ref 40–59)
HGB BLD-MCNC: 12.9 G/DL
IMM GRANULOCYTES # BLD AUTO: 0.02 X10(3) UL (ref 0–1)
IMM GRANULOCYTES NFR BLD: 0.2 %
LDLC SERPL CALC-MCNC: 111 MG/DL (ref ?–100)
LYMPHOCYTES # BLD AUTO: 3.73 X10(3) UL (ref 1–4)
LYMPHOCYTES NFR BLD AUTO: 41.7 %
MCH RBC QN AUTO: 28.3 PG (ref 26–34)
MCHC RBC AUTO-ENTMCNC: 32.7 G/DL (ref 31–37)
MCV RBC AUTO: 86.6 FL
MICROALBUMIN UR-MCNC: 1.76 MG/DL
MICROALBUMIN/CREAT 24H UR-RTO: 17.4 UG/MG (ref ?–30)
MONOCYTES # BLD AUTO: 0.49 X10(3) UL (ref 0.1–1)
MONOCYTES NFR BLD AUTO: 5.5 %
NEUTROPHILS # BLD AUTO: 3.89 X10 (3) UL (ref 1.5–7.7)
NEUTROPHILS # BLD AUTO: 3.89 X10(3) UL (ref 1.5–7.7)
NEUTROPHILS NFR BLD AUTO: 43.5 %
NONHDLC SERPL-MCNC: 132 MG/DL (ref ?–130)
OSMOLALITY SERPL CALC.SUM OF ELEC: 293 MOSM/KG (ref 275–295)
PLATELET # BLD AUTO: 347 10(3)UL (ref 150–450)
POTASSIUM SERPL-SCNC: 4.3 MMOL/L (ref 3.5–5.1)
PROT SERPL-MCNC: 7.5 G/DL (ref 6.4–8.2)
RBC # BLD AUTO: 4.56 X10(6)UL
SODIUM SERPL-SCNC: 140 MMOL/L (ref 136–145)
TRIGL SERPL-MCNC: 122 MG/DL (ref 30–149)
TSI SER-ACNC: 2.29 MIU/ML (ref 0.36–3.74)
VIT B12 SERPL-MCNC: 221 PG/ML (ref 193–986)
VIT D+METAB SERPL-MCNC: 62.2 NG/ML (ref 30–100)
VLDLC SERPL CALC-MCNC: 21 MG/DL (ref 0–30)
WBC # BLD AUTO: 8.9 X10(3) UL (ref 4–11)

## 2022-11-11 PROCEDURE — 80061 LIPID PANEL: CPT

## 2022-11-11 PROCEDURE — 85025 COMPLETE CBC W/AUTO DIFF WBC: CPT

## 2022-11-11 PROCEDURE — 85652 RBC SED RATE AUTOMATED: CPT

## 2022-11-11 PROCEDURE — 82607 VITAMIN B-12: CPT

## 2022-11-11 PROCEDURE — 84443 ASSAY THYROID STIM HORMONE: CPT

## 2022-11-11 PROCEDURE — 82043 UR ALBUMIN QUANTITATIVE: CPT

## 2022-11-11 PROCEDURE — 82570 ASSAY OF URINE CREATININE: CPT

## 2022-11-11 PROCEDURE — 36415 COLL VENOUS BLD VENIPUNCTURE: CPT

## 2022-11-11 PROCEDURE — 83036 HEMOGLOBIN GLYCOSYLATED A1C: CPT

## 2022-11-11 PROCEDURE — 86140 C-REACTIVE PROTEIN: CPT

## 2022-11-11 PROCEDURE — 80053 COMPREHEN METABOLIC PANEL: CPT

## 2022-11-11 PROCEDURE — 82306 VITAMIN D 25 HYDROXY: CPT

## 2022-11-13 RX ORDER — DULAGLUTIDE 0.75 MG/.5ML
1 INJECTION, SOLUTION SUBCUTANEOUS WEEKLY
Qty: 4 EACH | Refills: 3 | Status: SHIPPED | OUTPATIENT
Start: 2022-11-13

## 2022-11-13 RX ORDER — LISINOPRIL 5 MG/1
5 TABLET ORAL DAILY
Qty: 90 TABLET | Refills: 0 | Status: SHIPPED | OUTPATIENT
Start: 2022-11-13

## 2022-11-13 RX ORDER — GLIPIZIDE 5 MG/1
TABLET ORAL
Qty: 225 TABLET | Refills: 0 | OUTPATIENT
Start: 2022-11-13

## 2022-11-14 NOTE — TELEPHONE ENCOUNTER
Left a message to patient's daughter regarding the note below. Dr. Woo Shoulders, what dose of Jardiance do you want pt to take? Please advise:  Pended if 10 mg.

## 2022-11-17 ENCOUNTER — TELEPHONE (OUTPATIENT)
Dept: FAMILY MEDICINE CLINIC | Facility: CLINIC | Age: 62
End: 2022-11-17

## 2022-11-17 NOTE — TELEPHONE ENCOUNTER
Spoke to the pharmacists at [facility] and her insurance isn't effective until 12/1/22    Left message for Judith to call back.

## 2022-11-17 NOTE — TELEPHONE ENCOUNTER
Daughter states that [facility] does not have her proper insurance. She gave them her medicaid card but [facility] states that her medications are not going through. Patient is out of her medications.      Metformin  Lisinopril  Trulicity      Tried to call [facility] but they do not open until 10 am. Triage support please assist.

## 2022-12-07 ENCOUNTER — NURSE ONLY (OUTPATIENT)
Facility: CLINIC | Age: 62
End: 2022-12-07

## 2022-12-07 DIAGNOSIS — Z12.11 SCREEN FOR COLON CANCER: Primary | ICD-10-CM

## 2022-12-08 RX ORDER — POLYETHYLENE GLYCOL 3350, SODIUM CHLORIDE, SODIUM BICARBONATE, POTASSIUM CHLORIDE 420; 11.2; 5.72; 1.48 G/4L; G/4L; G/4L; G/4L
POWDER, FOR SOLUTION ORAL
Qty: 4000 ML | Refills: 0 | Status: SHIPPED | OUTPATIENT
Start: 2022-12-08

## 2022-12-08 NOTE — PROGRESS NOTES
Scheduling:  cln w/ mac w/ any female md  Dx: crc screening  trilyte split dose sent e-scribe    Hold lisinopril am of procedure  Hold trulicity 7 days prior to procedure  Hold metformin pm before and am of procedure

## 2022-12-12 NOTE — PROGRESS NOTES
Scheduled for:  Colonoscopy-screen 54236    Provider Name:  Dr. Ivon Guthrie   Date: Wednesday, 3/15/2023  [facility]  Sedation:  MAC  Time:  12:30 pm (pt is aware to arrive at 11:30 am)  Prep:  Split dose trilyte E-Prescribing Status: Receipt confirmed by pharmacy (12/8/2022 11:17 AM CST)  Meds/Allergies Reconciled?: maria esther Butcher reviewed   Diagnosis with codes:  Colorectal cancer screening Z12.11  Was patient informed to call insurance with codes (Y/N): I confirmed Medicaid insurance with this patient. Referral sent?:  Referral was sent at the time of electronic surgical scheduling. [facility] or [id number] notified?:  I sent an electronic request to Endo Scheduling and received a confirmation today. Medication Orders:  DO NOT TAKE: ACE inhibitor and angiotensin II receptor blockers (ARBs) night before and/ or day of procedure: Hold lisinopril am of procedure   Hold trulicity 7 days prior to procedure  Hold metformin pm before and am of procedure   Misc Orders:  Patient was informed that they will need a COVID 19 test prior to their procedure. Patient verbally understood & will await a phone call from [facility] to schedule. Further instructions given by staff:  I discussed the prep instructions with the patients daughter Rocky Frank found on CARSON which she verbally understood and is aware that I will send the instructions today via [employer]. Offered and patient declined

## 2022-12-20 NOTE — TELEPHONE ENCOUNTER
Goal Outcome Evaluation:  Plan of Care Reviewed With: patient        Progress: improving  Outcome Evaluation: PT tx completed. Pt c/o minimal pn, more in L LE and weakness. I bed mobility and transfers. Amb 250' S. I don/doffing LSO. Reinforced BLT's.   Left message to call back please transfer to triage. A mychart message was also sent. Left a detailed message per HIPPA form on Judith cell phone per instructions.

## 2022-12-31 RX ORDER — DULAGLUTIDE 0.75 MG/.5ML
1 INJECTION, SOLUTION SUBCUTANEOUS WEEKLY
Qty: 4 EACH | Refills: 3 | Status: CANCELLED | OUTPATIENT
Start: 2022-12-31

## 2023-01-22 ENCOUNTER — E-VISIT (OUTPATIENT)
Dept: TELEHEALTH | Age: 63
End: 2023-01-22

## 2023-01-22 DIAGNOSIS — Z02.9 ADMINISTRATIVE ENCOUNTER: Primary | ICD-10-CM

## 2023-01-23 ENCOUNTER — TELEPHONE (OUTPATIENT)
Dept: FAMILY MEDICINE CLINIC | Facility: CLINIC | Age: 63
End: 2023-01-23

## 2023-01-24 ENCOUNTER — OFFICE VISIT (OUTPATIENT)
Dept: FAMILY MEDICINE CLINIC | Facility: CLINIC | Age: 63
End: 2023-01-24

## 2023-01-24 VITALS
BODY MASS INDEX: 28.13 KG/M2 | SYSTOLIC BLOOD PRESSURE: 120 MMHG | HEART RATE: 75 BPM | DIASTOLIC BLOOD PRESSURE: 78 MMHG | WEIGHT: 134 LBS | TEMPERATURE: 98 F | HEIGHT: 57.9 IN

## 2023-01-24 DIAGNOSIS — N30.00 ACUTE CYSTITIS WITHOUT HEMATURIA: ICD-10-CM

## 2023-01-24 DIAGNOSIS — R10.2 PELVIC PAIN: ICD-10-CM

## 2023-01-24 DIAGNOSIS — E11.65 UNCONTROLLED TYPE 2 DIABETES MELLITUS WITH HYPERGLYCEMIA (HCC): ICD-10-CM

## 2023-01-24 DIAGNOSIS — R30.0 DYSURIA: Primary | ICD-10-CM

## 2023-01-24 LAB
APPEARANCE: CLEAR
BILIRUBIN: NEGATIVE
GLUCOSE (URINE DIPSTICK): NEGATIVE MG/DL
GLUCOSE BLOOD: 198
KETONES (URINE DIPSTICK): NEGATIVE MG/DL
MULTISTIX LOT#: ABNORMAL NUMERIC
NITRITE, URINE: NEGATIVE
PH, URINE: 5 (ref 4.5–8)
PROTEIN (URINE DIPSTICK): NEGATIVE MG/DL
SPECIFIC GRAVITY: 1.01 (ref 1–1.03)
TEST STRIP LOT #: NORMAL NUMERIC
URINE-COLOR: YELLOW
UROBILINOGEN,SEMI-QN: 0.2 MG/DL (ref 0–1.9)

## 2023-01-24 PROCEDURE — 82962 GLUCOSE BLOOD TEST: CPT | Performed by: FAMILY MEDICINE

## 2023-01-24 PROCEDURE — 81002 URINALYSIS NONAUTO W/O SCOPE: CPT | Performed by: FAMILY MEDICINE

## 2023-01-24 PROCEDURE — 3074F SYST BP LT 130 MM HG: CPT | Performed by: FAMILY MEDICINE

## 2023-01-24 PROCEDURE — 3008F BODY MASS INDEX DOCD: CPT | Performed by: FAMILY MEDICINE

## 2023-01-24 PROCEDURE — 99214 OFFICE O/P EST MOD 30 MIN: CPT | Performed by: FAMILY MEDICINE

## 2023-01-24 PROCEDURE — 3078F DIAST BP <80 MM HG: CPT | Performed by: FAMILY MEDICINE

## 2023-01-24 RX ORDER — CIPROFLOXACIN 500 MG/1
TABLET, FILM COATED ORAL
COMMUNITY
Start: 2023-01-23 | End: 2023-01-24

## 2023-01-24 RX ORDER — NITROFURANTOIN 25; 75 MG/1; MG/1
100 CAPSULE ORAL 2 TIMES DAILY
Qty: 14 CAPSULE | Refills: 0 | Status: SHIPPED | OUTPATIENT
Start: 2023-01-24 | End: 2023-01-31

## 2023-01-24 NOTE — TELEPHONE ENCOUNTER
Spoke to daughter, Dee Zuñiga. Went over [employer]. Symptoms Not worse today. Patient was made appt 9:45am Tuesday with PCP.

## 2023-02-10 ENCOUNTER — HOSPITAL ENCOUNTER (OUTPATIENT)
Dept: MAMMOGRAPHY | Facility: HOSPITAL | Age: 63
Discharge: HOME | End: 2023-02-10
Attending: FAMILY MEDICINE
Payer: MEDICAID

## 2023-02-10 DIAGNOSIS — Z12.31 ENCOUNTER FOR SCREENING MAMMOGRAM FOR BREAST CANCER: ICD-10-CM

## 2023-02-10 PROCEDURE — 77063 BREAST TOMOSYNTHESIS BI: CPT | Performed by: FAMILY MEDICINE

## 2023-02-10 PROCEDURE — 77067 SCR MAMMO BI INCL CAD: CPT | Performed by: FAMILY MEDICINE

## 2023-02-27 RX ORDER — LISINOPRIL 5 MG/1
5 TABLET ORAL DAILY
Qty: 90 TABLET | Refills: 1 | Status: SHIPPED | OUTPATIENT
Start: 2023-02-27

## 2023-03-09 ENCOUNTER — OFFICE VISIT (OUTPATIENT)
Dept: OPHTHALMOLOGY | Facility: CLINIC | Age: 63
End: 2023-03-09
Payer: MEDICAID

## 2023-03-09 DIAGNOSIS — E11.9 TYPE 2 DIABETES MELLITUS WITHOUT RETINOPATHY (HCC): Primary | ICD-10-CM

## 2023-03-09 DIAGNOSIS — H40.003 GLAUCOMA SUSPECT OF BOTH EYES: ICD-10-CM

## 2023-03-09 DIAGNOSIS — H25.13 AGE-RELATED NUCLEAR CATARACT OF BOTH EYES: ICD-10-CM

## 2023-03-09 PROCEDURE — 92250 FUNDUS PHOTOGRAPHY W/I&R: CPT | Performed by: OPHTHALMOLOGY

## 2023-03-09 PROCEDURE — 92004 COMPRE OPH EXAM NEW PT 1/>: CPT | Performed by: OPHTHALMOLOGY

## 2023-03-09 PROCEDURE — 92015 DETERMINE REFRACTIVE STATE: CPT | Performed by: OPHTHALMOLOGY

## 2023-03-09 PROCEDURE — 2023F DILAT RTA XM W/O RTNOPTHY: CPT | Performed by: OPHTHALMOLOGY

## 2023-03-09 NOTE — PATIENT INSTRUCTIONS
Type 2 diabetes mellitus without retinopathy ([facility] [address]  Diabetes type II: no background of retinopathy, no signs of neovascularization noted. Discussed ocular and systemic benefits of blood sugar control. Diagnosis and treatment discussed in detail with patient. Age related cataract  Discussed early cataracts with patient. No treatment recommended at this time. New glasses Rx given today for distance only glasses and reading only    Glaucoma suspect of both eyes  Discussed with patient that she is a glaucoma suspect based on increased cupping of the optic nerves in both eyes. Retinal photos taken today to document optic nerves. Glaucoma diagnostic testing ordered. Will not start medication, but will continue to observe. Patient verbalized understanding.     Will see patient for next available visual field, OCT and pachy with no MD, if glaucoma testing is normal will see patient in 1 year for a diabetic exam

## 2023-03-09 NOTE — ASSESSMENT & PLAN NOTE
Discussed with patient that she is a glaucoma suspect based on increased cupping of the optic nerves in both eyes. Retinal photos taken today to document optic nerves. Glaucoma diagnostic testing ordered. Will not start medication, but will continue to observe. Patient verbalized understanding.     Will see patient for next available visual field, OCT and pachy with no MD, if glaucoma testing is normal will see patient in 1 year for a diabetic exam

## 2023-03-09 NOTE — ASSESSMENT & PLAN NOTE
Discussed early cataracts with patient. No treatment recommended at this time.      New glasses Rx given today for distance only glasses and reading only

## 2023-03-15 ENCOUNTER — HOSPITAL ENCOUNTER (OUTPATIENT)
Facility: HOSPITAL | Age: 63
Setting detail: HOSPITAL OUTPATIENT SURGERY
Discharge: HOME | End: 2023-03-15
Attending: INTERNAL MEDICINE | Admitting: INTERNAL MEDICINE
Payer: MEDICAID

## 2023-03-15 ENCOUNTER — ANESTHESIA (OUTPATIENT)
Dept: ENDOSCOPY | Facility: HOSPITAL | Age: 63
End: 2023-03-15
Payer: MEDICAID

## 2023-03-15 ENCOUNTER — ANESTHESIA EVENT (OUTPATIENT)
Dept: ENDOSCOPY | Facility: HOSPITAL | Age: 63
End: 2023-03-15
Payer: MEDICAID

## 2023-03-15 VITALS
BODY MASS INDEX: 27.71 KG/M2 | OXYGEN SATURATION: 97 % | HEIGHT: 58 IN | TEMPERATURE: 97 F | WEIGHT: 132 LBS | HEART RATE: 72 BPM | DIASTOLIC BLOOD PRESSURE: 80 MMHG | RESPIRATION RATE: 22 BRPM | SYSTOLIC BLOOD PRESSURE: 124 MMHG

## 2023-03-15 DIAGNOSIS — Z12.11 SCREEN FOR COLON CANCER: ICD-10-CM

## 2023-03-15 LAB — GLUCOSE BLDC GLUCOMTR-MCNC: 224 MG/DL (ref 70–99)

## 2023-03-15 PROCEDURE — 45380 COLONOSCOPY AND BIOPSY: CPT | Performed by: INTERNAL MEDICINE

## 2023-03-15 PROCEDURE — 0DBK8ZX EXCISION OF ASCENDING COLON, VIA NATURAL OR ARTIFICIAL OPENING ENDOSCOPIC, DIAGNOSTIC: ICD-10-PCS | Performed by: INTERNAL MEDICINE

## 2023-03-15 PROCEDURE — 45385 COLONOSCOPY W/LESION REMOVAL: CPT | Performed by: INTERNAL MEDICINE

## 2023-03-15 PROCEDURE — 0DBM8ZX EXCISION OF DESCENDING COLON, VIA NATURAL OR ARTIFICIAL OPENING ENDOSCOPIC, DIAGNOSTIC: ICD-10-PCS | Performed by: INTERNAL MEDICINE

## 2023-03-15 RX ORDER — SODIUM CHLORIDE, SODIUM LACTATE, POTASSIUM CHLORIDE, CALCIUM CHLORIDE 600; 310; 30; 20 MG/100ML; MG/100ML; MG/100ML; MG/100ML
INJECTION, SOLUTION INTRAVENOUS CONTINUOUS
Status: DISCONTINUED | OUTPATIENT
Start: 2023-03-15 | End: 2023-03-15

## 2023-03-15 RX ORDER — LIDOCAINE HYDROCHLORIDE 10 MG/ML
INJECTION, SOLUTION EPIDURAL; INFILTRATION; INTRACAUDAL; PERINEURAL AS NEEDED
Status: DISCONTINUED | OUTPATIENT
Start: 2023-03-15 | End: 2023-03-15 | Stop reason: SURG

## 2023-03-15 RX ADMIN — LIDOCAINE HYDROCHLORIDE 50 MG: 10 INJECTION, SOLUTION EPIDURAL; INFILTRATION; INTRACAUDAL; PERINEURAL at 10:42:00

## 2023-03-15 RX ADMIN — SODIUM CHLORIDE, SODIUM LACTATE, POTASSIUM CHLORIDE, CALCIUM CHLORIDE: 600; 310; 30; 20 INJECTION, SOLUTION INTRAVENOUS at 10:39:00

## 2023-03-15 NOTE — DISCHARGE INSTRUCTIONS

## 2023-03-29 ENCOUNTER — TELEPHONE (OUTPATIENT)
Dept: OPHTHALMOLOGY | Facility: CLINIC | Age: 63
End: 2023-03-29

## 2023-03-31 NOTE — TELEPHONE ENCOUNTER
Patient has not been feeling well per her daughter, so she wants to wait a bit to RS.   I rebooked her on 4/26/23 at 3:30 pm.

## 2023-04-26 ENCOUNTER — NURSE ONLY (OUTPATIENT)
Dept: OPHTHALMOLOGY | Facility: CLINIC | Age: 63
End: 2023-04-26

## 2023-04-26 DIAGNOSIS — H40.003 GLAUCOMA SUSPECT OF BOTH EYES: ICD-10-CM

## 2023-04-26 PROCEDURE — 92133 CPTRZD OPH DX IMG PST SGM ON: CPT | Performed by: OPHTHALMOLOGY

## 2023-04-26 PROCEDURE — 92083 EXTENDED VISUAL FIELD XM: CPT | Performed by: OPHTHALMOLOGY

## 2023-04-26 PROCEDURE — 76514 ECHO EXAM OF EYE THICKNESS: CPT | Performed by: OPHTHALMOLOGY

## 2023-08-21 ENCOUNTER — LAB ENCOUNTER (OUTPATIENT)
Dept: LAB | Age: 63
End: 2023-08-21
Attending: FAMILY MEDICINE
Payer: MEDICAID

## 2023-08-21 ENCOUNTER — OFFICE VISIT (OUTPATIENT)
Dept: FAMILY MEDICINE CLINIC | Facility: CLINIC | Age: 63
End: 2023-08-21

## 2023-08-21 VITALS
SYSTOLIC BLOOD PRESSURE: 131 MMHG | DIASTOLIC BLOOD PRESSURE: 80 MMHG | HEIGHT: 58 IN | WEIGHT: 128 LBS | HEART RATE: 91 BPM | BODY MASS INDEX: 26.87 KG/M2

## 2023-08-21 DIAGNOSIS — E53.8 B12 DEFICIENCY: ICD-10-CM

## 2023-08-21 DIAGNOSIS — E55.9 VITAMIN D DEFICIENCY: ICD-10-CM

## 2023-08-21 DIAGNOSIS — E11.65 UNCONTROLLED TYPE 2 DIABETES MELLITUS WITH HYPERGLYCEMIA (HCC): ICD-10-CM

## 2023-08-21 DIAGNOSIS — Z11.59 NEED FOR HEPATITIS C SCREENING TEST: ICD-10-CM

## 2023-08-21 DIAGNOSIS — E78.00 HYPERCHOLESTEREMIA: ICD-10-CM

## 2023-08-21 DIAGNOSIS — E11.65 UNCONTROLLED TYPE 2 DIABETES MELLITUS WITH HYPERGLYCEMIA (HCC): Primary | ICD-10-CM

## 2023-08-21 DIAGNOSIS — Z23 NEED FOR PNEUMOCOCCAL 20-VALENT CONJUGATE VACCINATION: ICD-10-CM

## 2023-08-21 LAB
ALBUMIN SERPL-MCNC: 3.7 G/DL (ref 3.4–5)
ALBUMIN/GLOB SERPL: 1 {RATIO} (ref 1–2)
ALP LIVER SERPL-CCNC: 89 U/L
ALT SERPL-CCNC: 34 U/L
ANION GAP SERPL CALC-SCNC: 7 MMOL/L (ref 0–18)
AST SERPL-CCNC: 23 U/L (ref 15–37)
BILIRUB SERPL-MCNC: 0.6 MG/DL (ref 0.1–2)
BUN BLD-MCNC: 13 MG/DL (ref 7–18)
BUN/CREAT SERPL: 17.6 (ref 10–20)
CALCIUM BLD-MCNC: 10.3 MG/DL (ref 8.5–10.1)
CHLORIDE SERPL-SCNC: 104 MMOL/L (ref 98–112)
CHOLEST SERPL-MCNC: 222 MG/DL (ref ?–200)
CO2 SERPL-SCNC: 25 MMOL/L (ref 21–32)
CREAT BLD-MCNC: 0.74 MG/DL
CREAT UR-SCNC: 184 MG/DL
EGFRCR SERPLBLD CKD-EPI 2021: 90 ML/MIN/1.73M2 (ref 60–?)
EST. AVERAGE GLUCOSE BLD GHB EST-MCNC: 249 MG/DL (ref 68–126)
FASTING PATIENT LIPID ANSWER: YES
FASTING STATUS PATIENT QL REPORTED: YES
GLOBULIN PLAS-MCNC: 3.7 G/DL (ref 2.8–4.4)
GLUCOSE BLD-MCNC: 207 MG/DL (ref 70–99)
HBA1C MFR BLD: 10.3 % (ref ?–5.7)
HCV AB SERPL QL IA: NONREACTIVE
HDLC SERPL-MCNC: 81 MG/DL (ref 40–59)
LDLC SERPL CALC-MCNC: 117 MG/DL (ref ?–100)
MICROALBUMIN UR-MCNC: 2.14 MG/DL
MICROALBUMIN/CREAT 24H UR-RTO: 11.6 UG/MG (ref ?–30)
NONHDLC SERPL-MCNC: 141 MG/DL (ref ?–130)
OSMOLALITY SERPL CALC.SUM OF ELEC: 288 MOSM/KG (ref 275–295)
POTASSIUM SERPL-SCNC: 4.1 MMOL/L (ref 3.5–5.1)
PROT SERPL-MCNC: 7.4 G/DL (ref 6.4–8.2)
SODIUM SERPL-SCNC: 136 MMOL/L (ref 136–145)
TRIGL SERPL-MCNC: 138 MG/DL (ref 30–149)
VIT B12 SERPL-MCNC: 372 PG/ML (ref 193–986)
VIT D+METAB SERPL-MCNC: 80.3 NG/ML (ref 30–100)
VLDLC SERPL CALC-MCNC: 24 MG/DL (ref 0–30)

## 2023-08-21 PROCEDURE — 82306 VITAMIN D 25 HYDROXY: CPT

## 2023-08-21 PROCEDURE — 36415 COLL VENOUS BLD VENIPUNCTURE: CPT

## 2023-08-21 PROCEDURE — 82570 ASSAY OF URINE CREATININE: CPT

## 2023-08-21 PROCEDURE — 82607 VITAMIN B-12: CPT

## 2023-08-21 PROCEDURE — 83036 HEMOGLOBIN GLYCOSYLATED A1C: CPT

## 2023-08-21 PROCEDURE — 80053 COMPREHEN METABOLIC PANEL: CPT

## 2023-08-21 PROCEDURE — 86803 HEPATITIS C AB TEST: CPT

## 2023-08-21 PROCEDURE — 80061 LIPID PANEL: CPT

## 2023-08-21 PROCEDURE — 82043 UR ALBUMIN QUANTITATIVE: CPT

## 2023-08-21 RX ORDER — CIPROFLOXACIN 500 MG/1
TABLET, FILM COATED ORAL
COMMUNITY
Start: 2023-07-15

## 2023-08-28 DIAGNOSIS — E55.9 VITAMIN D DEFICIENCY: ICD-10-CM

## 2023-08-28 DIAGNOSIS — E11.65 UNCONTROLLED TYPE 2 DIABETES MELLITUS WITH HYPERGLYCEMIA (HCC): Primary | ICD-10-CM

## 2023-08-28 DIAGNOSIS — E78.00 HYPERCHOLESTEREMIA: ICD-10-CM

## 2023-08-28 RX ORDER — DULAGLUTIDE 1.5 MG/.5ML
1 INJECTION, SOLUTION SUBCUTANEOUS WEEKLY
Qty: 2 ML | Refills: 3 | Status: SHIPPED | OUTPATIENT
Start: 2023-08-28

## 2023-08-28 RX ORDER — EZETIMIBE 10 MG/1
10 TABLET ORAL DAILY
Qty: 90 TABLET | Refills: 3 | Status: SHIPPED | OUTPATIENT
Start: 2023-08-28 | End: 2024-08-22

## 2023-08-29 ENCOUNTER — TELEPHONE (OUTPATIENT)
Dept: FAMILY MEDICINE CLINIC | Facility: CLINIC | Age: 63
End: 2023-08-29

## 2023-09-04 RX ORDER — LISINOPRIL 5 MG/1
5 TABLET ORAL DAILY
Qty: 90 TABLET | Refills: 3 | Status: SHIPPED | OUTPATIENT
Start: 2023-09-04

## 2023-10-23 ENCOUNTER — OFFICE VISIT (OUTPATIENT)
Dept: FAMILY MEDICINE CLINIC | Facility: CLINIC | Age: 63
End: 2023-10-23
Payer: MEDICARE

## 2023-10-23 VITALS
DIASTOLIC BLOOD PRESSURE: 67 MMHG | SYSTOLIC BLOOD PRESSURE: 104 MMHG | BODY MASS INDEX: 27.29 KG/M2 | HEIGHT: 58 IN | HEART RATE: 93 BPM | WEIGHT: 130 LBS | OXYGEN SATURATION: 98 % | TEMPERATURE: 98 F

## 2023-10-23 DIAGNOSIS — Z78.0 POSTMENOPAUSAL: ICD-10-CM

## 2023-10-23 DIAGNOSIS — R05.1 ACUTE COUGH: Primary | ICD-10-CM

## 2023-10-23 PROCEDURE — 99214 OFFICE O/P EST MOD 30 MIN: CPT | Performed by: FAMILY MEDICINE

## 2023-10-23 RX ORDER — AZITHROMYCIN 250 MG/1
TABLET, FILM COATED ORAL
Qty: 6 TABLET | Refills: 0 | Status: SHIPPED | OUTPATIENT
Start: 2023-10-23 | End: 2023-10-27

## 2023-10-23 RX ORDER — GUAIFENESIN AND CODEINE PHOSPHATE 100; 10 MG/5ML; MG/5ML
5 SOLUTION ORAL NIGHTLY PRN
Qty: 118 ML | Refills: 0 | Status: SHIPPED | OUTPATIENT
Start: 2023-10-23 | End: 2023-11-06

## 2023-10-23 RX ORDER — LEVOCETIRIZINE DIHYDROCHLORIDE 5 MG/1
5 TABLET, FILM COATED ORAL EVERY EVENING
Qty: 30 TABLET | Refills: 1 | Status: SHIPPED | OUTPATIENT
Start: 2023-10-23

## 2023-10-23 RX ORDER — ALBUTEROL SULFATE 90 UG/1
2 AEROSOL, METERED RESPIRATORY (INHALATION) EVERY 6 HOURS PRN
Qty: 1 EACH | Refills: 0 | Status: SHIPPED | OUTPATIENT
Start: 2023-10-23

## 2023-12-23 NOTE — TELEPHONE ENCOUNTER
[employer] message sent for dose clarification of Metformin and Glipizide . No dispense hx . OFFICE VISIT 10/23/23; Instructions    Return in about 1 month (around 11/23/2023) for medicare wellness. Future Appointments   Date Time Provider Maksim Posadasi   1/3/2024  6:20 PM WDR DEXA RM1 WDR DEXA EDW Leann   1/15/2024  8:45 AM Jimmy Blankenship MD [facility]   3/14/2024  2:30 PM Malathi Hudson MD [address] [facility] THE [facility]     8/21/23 Office visit note ;  1. Uncontrolled type 2 diabetes mellitus with hyperglycemia ([address]  Check labs  Taking metformin 1/2 tab daily and trulicity 1 x week  - HEMOGLOBIN A1C; Future  - MICROALB/CREAT RATIO, RANDOM URINE;  Future

## 2023-12-26 RX ORDER — GLIPIZIDE 10 MG/1
5 TABLET ORAL
Qty: 90 TABLET | Refills: 3 | OUTPATIENT
Start: 2023-12-26

## 2023-12-29 NOTE — TELEPHONE ENCOUNTER
Protocol Failed/ No Protocol    Requested Prescriptions   Pending Prescriptions Disp Refills    METFORMIN 500 MG Oral Tab [Pharmacy Med Name: metFORMIN HCl Oral Tablet 500 MG] 90 tablet 0     Sig: TAKE ONE TABLET BY MOUTH DAILY WITH BREAKFAST       Diabetes Medication Protocol Failed - 12/27/2023  6:07 PM        Failed - Last A1C < 7.5 and within past 6 months     Lab Results   Component Value Date    A1C 10.3 (H) 08/21/2023             Passed - In person appointment or virtual visit in the past 6 mos or appointment in next 3 mos     Recent Outpatient Visits              [facility] ago [facility]    [facility]    4 months ago Uncontrolled type 2 diabetes mellitus with hyperglycemia [facility])    [facility], Ron Tay MD    Office Visit    8 months ago Glaucoma suspect of both eyes    [address], [address], [place]    Nurse Only    9 months ago Type 2 diabetes mellitus without retinopathy ([address]    [facility] Finley, Liza Bagley MD    Office Visit    11 months ago Purnima Dey, Ron Tay MD    Office Visit          Future Appointments         Provider Department Appt Notes    In 6 days WDR DONAL RM1; WDR DEXA [address] [facility] check    In 1 week Thomas Park MD [address], [place] Blood work    In 2 months Latisha Jay MD [address], [address], [address] EP/DM EE               Passed - EGFRCR or GFRNAA > 50     GFR Evaluation  EGFRCR: 90 , resulted on 8/21/2023          Passed - GFR in the past 12 months          METFORMIN HCL 1000 MG Oral Tab [Pharmacy Med Name: metFORMIN HCl Oral Tablet 1000 MG] 180 tablet 0     Sig: TAKE ONE TABLET BY MOUTH TWICE DAILY       Diabetes Medication Protocol Failed - 12/27/2023  6:07 PM        Failed - Last A1C < 7.5 and within past 6 months     Lab Results   Component Value Date    A1C 10.3 (H) 08/21/2023             Passed - In person appointment or virtual visit in the past 6 mos or appointment in next 3 mos     Recent Outpatient Visits              2 months ago Acute cough    Dakota Alford MD    Office Visit    4 months ago Uncontrolled type 2 diabetes mellitus with hyperglycemia [facility])    Dakota Alford MD    Office Visit    8 months ago Glaucoma suspect of both eyes    [address], [address], [place]    Nurse Only    9 months ago Type 2 diabetes mellitus without retinopathy ([facility] [address]    [address] [facility], Rosi Canales MD    Office Visit    11 months ago Tom Villagomez MD    Office Visit          Future Appointments         Provider Department Appt Notes    In 6 days WDR [facility] RM1; WDR DEXA [facility] check    In 1 week Valerie Granger MD [address], [place] Blood work    In 2 months Komal Wilson MD [address], [address] [facility] EP/DM EE               Passed - EGFRCR or GFRNAA > 50     GFR Evaluation  EGFRCR: 90 , resulted on 8/21/2023          Passed - GFR in the past 12 months             Future Appointments         Provider Department Appt Notes    In 6 days WDR [facility] RM1; WDR DEXA [facility] check    In 1 week Valerie Granger MD [address], [place] Blood work    In 2 months Komal Wilson MD [address], [address], [place] EP/DM EE          Recent Outpatient Visits              2 months ago Acute cough    [address], [address] [facility] Bruna Hendricks MD    Office Visit    4 months ago Uncontrolled type 2 diabetes mellitus with hyperglycemia [facility])    Jj Baca MD    Office Visit    8 months ago Glaucoma suspect of both eyes    Mimi Woodard, [address], [place]    Nurse Only    9 months ago Type 2 diabetes mellitus without retinopathy [facility])    Elliot Arambula MD    Office Visit    11 months ago Patti Holly MD    Office Visit

## 2024-01-03 ENCOUNTER — HOSPITAL ENCOUNTER (OUTPATIENT)
Dept: BONE DENSITY | Age: 64
Discharge: HOME | End: 2024-01-03
Attending: FAMILY MEDICINE
Payer: MEDICARE

## 2024-01-03 DIAGNOSIS — Z78.0 POSTMENOPAUSAL: ICD-10-CM

## 2024-01-03 PROCEDURE — 77080 DXA BONE DENSITY AXIAL: CPT | Performed by: FAMILY MEDICINE

## 2024-01-04 ENCOUNTER — HOSPITAL ENCOUNTER (OUTPATIENT)
Dept: GENERAL RADIOLOGY | Age: 64
Discharge: HOME | End: 2024-01-04
Attending: FAMILY MEDICINE
Payer: MEDICARE

## 2024-01-04 DIAGNOSIS — R05.1 ACUTE COUGH: ICD-10-CM

## 2024-01-09 ENCOUNTER — OFFICE VISIT (OUTPATIENT)
Dept: FAMILY MEDICINE CLINIC | Facility: CLINIC | Age: 64
End: 2024-01-09
Payer: MEDICARE

## 2024-01-09 ENCOUNTER — LAB ENCOUNTER (OUTPATIENT)
Dept: LAB | Age: 64
End: 2024-01-09
Attending: FAMILY MEDICINE
Payer: MEDICARE

## 2024-01-09 ENCOUNTER — TELEPHONE (OUTPATIENT)
Dept: FAMILY MEDICINE CLINIC | Facility: CLINIC | Age: 64
End: 2024-01-09

## 2024-01-09 VITALS
HEART RATE: 78 BPM | TEMPERATURE: 97 F | SYSTOLIC BLOOD PRESSURE: 103 MMHG | WEIGHT: 127 LBS | BODY MASS INDEX: 26.66 KG/M2 | DIASTOLIC BLOOD PRESSURE: 65 MMHG | HEIGHT: 58 IN

## 2024-01-09 DIAGNOSIS — E78.00 HYPERCHOLESTEREMIA: ICD-10-CM

## 2024-01-09 DIAGNOSIS — Z12.31 ENCOUNTER FOR SCREENING MAMMOGRAM FOR BREAST CANCER: ICD-10-CM

## 2024-01-09 DIAGNOSIS — E55.9 VITAMIN D DEFICIENCY: ICD-10-CM

## 2024-01-09 DIAGNOSIS — E11.65 UNCONTROLLED TYPE 2 DIABETES MELLITUS WITH HYPERGLYCEMIA (HCC): Primary | ICD-10-CM

## 2024-01-09 DIAGNOSIS — E11.65 UNCONTROLLED TYPE 2 DIABETES MELLITUS WITH HYPERGLYCEMIA (HCC): ICD-10-CM

## 2024-01-09 LAB
ALBUMIN SERPL-MCNC: 4.4 G/DL (ref 3.2–4.8)
ALBUMIN/GLOB SERPL: 1.5 {RATIO} (ref 1–2)
ALP LIVER SERPL-CCNC: 78 U/L
ALT SERPL-CCNC: 34 U/L
ANION GAP SERPL CALC-SCNC: 4 MMOL/L (ref 0–18)
AST SERPL-CCNC: 32 U/L (ref ?–34)
BILIRUB SERPL-MCNC: 0.5 MG/DL (ref 0.2–1.1)
BUN BLD-MCNC: 10 MG/DL (ref 9–23)
BUN/CREAT SERPL: 14.7 (ref 10–20)
CALCIUM BLD-MCNC: 9.8 MG/DL (ref 8.7–10.4)
CHLORIDE SERPL-SCNC: 106 MMOL/L (ref 98–112)
CHOLEST SERPL-MCNC: 186 MG/DL (ref ?–200)
CO2 SERPL-SCNC: 26 MMOL/L (ref 21–32)
CREAT BLD-MCNC: 0.68 MG/DL
EGFRCR SERPLBLD CKD-EPI 2021: 97 ML/MIN/1.73M2 (ref 60–?)
EST. AVERAGE GLUCOSE BLD GHB EST-MCNC: 189 MG/DL (ref 68–126)
FASTING PATIENT LIPID ANSWER: YES
FASTING STATUS PATIENT QL REPORTED: YES
GLOBULIN PLAS-MCNC: 3 G/DL (ref 2.8–4.4)
GLUCOSE BLD-MCNC: 169 MG/DL (ref 70–99)
HBA1C MFR BLD: 8.2 % (ref ?–5.7)
HDLC SERPL-MCNC: 82 MG/DL (ref 40–59)
LDLC SERPL CALC-MCNC: 86 MG/DL (ref ?–100)
NONHDLC SERPL-MCNC: 104 MG/DL (ref ?–130)
OSMOLALITY SERPL CALC.SUM OF ELEC: 285 MOSM/KG (ref 275–295)
POTASSIUM SERPL-SCNC: 4.2 MMOL/L (ref 3.5–5.1)
PROT SERPL-MCNC: 7.4 G/DL (ref 5.7–8.2)
SODIUM SERPL-SCNC: 136 MMOL/L (ref 136–145)
TRIGL SERPL-MCNC: 104 MG/DL (ref 30–149)
VIT D+METAB SERPL-MCNC: 68.5 NG/ML (ref 30–100)
VLDLC SERPL CALC-MCNC: 17 MG/DL (ref 0–30)

## 2024-01-09 PROCEDURE — 36415 COLL VENOUS BLD VENIPUNCTURE: CPT

## 2024-01-09 PROCEDURE — 80053 COMPREHEN METABOLIC PANEL: CPT

## 2024-01-09 PROCEDURE — 90662 IIV NO PRSV INCREASED AG IM: CPT | Performed by: FAMILY MEDICINE

## 2024-01-09 PROCEDURE — 82306 VITAMIN D 25 HYDROXY: CPT

## 2024-01-09 PROCEDURE — 80061 LIPID PANEL: CPT

## 2024-01-09 PROCEDURE — 99214 OFFICE O/P EST MOD 30 MIN: CPT | Performed by: FAMILY MEDICINE

## 2024-01-09 PROCEDURE — G0008 ADMIN INFLUENZA VIRUS VAC: HCPCS | Performed by: FAMILY MEDICINE

## 2024-01-09 PROCEDURE — 83036 HEMOGLOBIN GLYCOSYLATED A1C: CPT

## 2024-01-09 NOTE — PROGRESS NOTES
HPI:    Patient ID: Brooklyn Macias is a 63 year old female.      HPI    Chief Complaint   Patient presents with    Cholesterol     Follow up       Diabetes     Follow up          Wt Readings from Last 6 Encounters:   01/09/24 127 lb (57.6 kg)   10/23/23 130 lb (59 kg)   08/21/23 128 lb (58.1 kg)   03/15/23 132 lb (59.9 kg)   01/24/23 134 lb (60.8 kg)   11/09/22 136 lb (61.7 kg)     BP Readings from Last 3 Encounters:   01/09/24 103/65   10/23/23 104/67   08/21/23 131/80     Was in  [place]  last month for family death.    Trying to watch diet    Gas been taking cholesterol medication, cannot recall name   Taking metformin  ? 1000mg twice a day.  Will confirm doses.  Vitamin D 5000 2 x week.      Review of Systems   Constitutional:  Negative for chills and fever.   Eyes:  Negative for visual disturbance.   Respiratory:  Negative for cough, shortness of breath and wheezing.    Cardiovascular:  Negative for chest pain, palpitations and leg swelling.   Genitourinary:  Negative for decreased urine volume and difficulty urinating.   Neurological:  Negative for dizziness, tremors, seizures, weakness, light-headedness, numbness and headaches.       /65   Pulse 78   Temp 96.6 °F (35.9 °C) (Temporal)   Ht 4' 10\" (1.473 m)   Wt 127 lb (57.6 kg)   BMI 26.54 kg/m²          Current Outpatient Medications   Medication Sig Dispense Refill    metFORMIN 500 MG Oral Tab Take 1 tablet (500 mg total) by mouth daily with breakfast. 90 tablet 3    levocetirizine 5 MG Oral Tab Take 1 tablet (5 mg total) by mouth every evening. 30 tablet 1    lisinopril 5 MG Oral Tab Take 1 tablet (5 mg total) by mouth daily. 90 tablet 3    Dulaglutide (TRULICITY) 1.5 MG/0.5ML Subcutaneous Solution Pen-injector Inject 1 Dose into the skin once a week. 2 mL 3    MICROLET LANCETS Does not apply Misc USE TWICE DAILY TO CHECK BLOOD GLUCOSE 200 each 0    Blood Glucose Monitoring Suppl (iAgree CONTOUR NEXT MONITOR) w/Device Does not apply Kit TEST UTD  0     CONTOUR NEXT TEST In Vitro Strip USE TEST STRIPS TWICE DAILY TO CHECK BLOOD GLUCOSE 200 strip 0    albuterol 108 (90 Base) MCG/ACT Inhalation Aero Soln Inhale 2 puffs into the lungs every 6 (six) hours as needed for Wheezing or Shortness of Breath. (Patient not taking: Reported on 1/9/2024) 1 each 0     Allergies:  Allergies   Allergen Reactions    Zetia [Ezetimibe] DIZZINESS    Atorvastatin RASH    Pravastatin TONGUE SWELLING      PHYSICAL EXAM:     Chief Complaint   Patient presents with    Cholesterol     Follow up       Diabetes     Follow up         Physical Exam  Vitals and nursing note reviewed.   Constitutional:       Appearance: She is well-developed.   Eyes:      Pupils: Pupils are equal, round, and reactive to light.   Neck:      Thyroid: No thyromegaly.   Cardiovascular:      Rate and Rhythm: Normal rate and regular rhythm.      Heart sounds: No murmur heard.  Pulmonary:      Effort: Pulmonary effort is normal.      Breath sounds: Normal breath sounds. No wheezing.   Abdominal:      Palpations: There is no mass.      Tenderness: There is no abdominal tenderness. There is no right CVA tenderness or left CVA tenderness.   Musculoskeletal:      Cervical back: Normal range of motion and neck supple.      Right lower leg: No edema.      Left lower leg: No edema.   Skin:     General: Skin is warm and dry.      Findings: No rash.   Neurological:      Mental Status: She is alert and oriented to person, place, and time.       Bilateral barefoot skin diabetic exam is normal, visualized feet and the appearance is normal.  Bilateral monofilament/sensation of both feet is normal.  Pulsation pedal pulse exam of both lower legs/feet is normal as well.             ASSESSMENT/PLAN:     Encounter Diagnoses   Name Primary?    Uncontrolled type 2 diabetes mellitus with hyperglycemia (HCC) Yes    Vitamin D deficiency     Hypercholesteremia     Encounter for screening mammogram for breast cancer        1. Uncontrolled type  2 diabetes mellitus with hyperglycemia (HCC)  Recheck labs  - Hemoglobin A1C; Future  - OPHTHALMOLOGY - INTERNAL    2. Vitamin D deficiency  Has decreased dose of vitamin d   - Vitamin D [E]; Future    3. Hypercholesteremia  Taking medication, unsure which one  - Lipid Panel; Future  - Comp Metabolic Panel (14); Future    4. Encounter for screening mammogram for breast cancer  Due feb 2024  - [facility] ALENA 2D+3D SCREENING BILAT (CPT=77067/95148); Future      Orders Placed This Encounter   Procedures    Hemoglobin A1C    Lipid Panel    Comp Metabolic Panel (14)    Vitamin D [E]    High Dose Fluzone 65 yr and older PFS [64770]         The above note was creating using Dragon speech recognition technology. Please excuse any typos    Meds This Visit:  Requested Prescriptions      No prescriptions requested or ordered in this encounter       Imaging & Referrals:  INFLUENZA VAC HIGH DOSE PRSV FREE  OPHTHALMOLOGY - INTERNAL  [facility] ALENA 2D+3D SCREENING BILAT (CPT=77067/49123)       ID#[id number]

## 2024-01-11 RX ORDER — LISINOPRIL 5 MG/1
5 TABLET ORAL DAILY
Qty: 90 TABLET | Refills: 3 | OUTPATIENT
Start: 2024-01-11

## 2024-01-11 NOTE — TELEPHONE ENCOUNTER
Sena , Pharmacist is requesting the following refills for patient and mentions the patient had them previously filled at [facility] and requesting the change to all medications be filled at preferred pharmacy, [facility].   Please advise.    Call back #[phone]

## 2024-01-14 DIAGNOSIS — R05.1 ACUTE COUGH: ICD-10-CM

## 2024-01-14 RX ORDER — CODEINE PHOSPHATE AND GUAIFENESIN 10; 100 MG/5ML; MG/5ML
5 SOLUTION ORAL NIGHTLY PRN
Qty: 118 ML | Refills: 0 | OUTPATIENT
Start: 2024-01-14

## 2024-01-16 DIAGNOSIS — E11.65 UNCONTROLLED TYPE 2 DIABETES MELLITUS WITH HYPERGLYCEMIA (HCC): Primary | ICD-10-CM

## 2024-01-16 RX ORDER — DULAGLUTIDE 1.5 MG/.5ML
1 INJECTION, SOLUTION SUBCUTANEOUS WEEKLY
Qty: 2 ML | Refills: 3 | Status: SHIPPED | OUTPATIENT
Start: 2024-01-16

## 2024-01-21 RX ORDER — EZETIMIBE 10 MG/1
10 TABLET ORAL DAILY
Qty: 90 TABLET | Refills: 3 | Status: SHIPPED | OUTPATIENT
Start: 2024-01-21

## 2024-01-21 NOTE — TELEPHONE ENCOUNTER
Refill passed per [facility] protocol.  High allergy warning    Requested Prescriptions   Pending Prescriptions Disp Refills    EZETIMIBE 10 MG Oral Tab [Pharmacy Med Name: EZETIMIBE 10 MG ORAL TABLET] 90 tablet 0     Sig: TAKE ONE TABLET BY MOUTH ONCE DAILY       Cholesterol Medication Protocol Passed - 1/19/2024 12:21 PM        Passed - ALT in past 12 months        Passed - LDL in past 12 months        Passed - Last ALT < 80     Lab Results   Component Value Date    ALT 34 01/09/2024             Passed - Last LDL < 130     Lab Results   Component Value Date    LDL 86 01/09/2024             Passed - In person appointment or virtual visit in the past 12 mos or appointment in next 3 mos     Recent Outpatient Visits              1 week ago Uncontrolled type 2 diabetes mellitus with hyperglycemia ([facility]    [facility]    3 months ago [facility] cough    [facility]    Office Visit    5 months ago Uncontrolled type 2 diabetes mellitus with hyperglycemia ([facility])    [facility]    Office Visit    9 months ago Glaucoma suspect of both eyes    [facility]    Nurse Only    10 months ago Type 2 diabetes mellitus without retinopathy ([facility])    [facility] Marek Xavier MD    Office Visit          Future Appointments         Provider Department Appt Notes    In 1 month Marek Xavier MD [facility] EP/DM EE                    Recent Outpatient Visits              1 week ago Uncontrolled type 2 diabetes mellitus with hyperglycemia ([facility])    [facility]    Office Visit    3 months ago Acute cough    [facility]  Britney Lemus MD    Office Visit    5 months ago Uncontrolled type 2 diabetes mellitus with hyperglycemia (HCC)    [facility] Britney Blankenship MD    Office Visit    9 months ago Glaucoma suspect of both eyes    [facility]    Nurse Only    10 months ago Type 2 diabetes mellitus without retinopathy (HCC)    [facility]    Office Visit            Future Appointments         Provider Department Appt Notes    In 1 month [facility]arek Xavier MD [facility] EP/DM EE

## 2024-02-19 NOTE — TELEPHONE ENCOUNTER
Patient mostly lives in [place] and was to establish with a provider. She also was told to follow-up with endocrinology due to uncontrolled diabetes. Not sure if she has done that. Last visit with me was May 2019. She is also overdue for labs. fall

## 2024-03-22 DIAGNOSIS — E11.65 UNCONTROLLED TYPE 2 DIABETES MELLITUS WITH HYPERGLYCEMIA (HCC): ICD-10-CM

## 2024-03-24 NOTE — TELEPHONE ENCOUNTER
Please review. Protocol failed / No protocol.    Requested Prescriptions   Pending Prescriptions Disp Refills    TRULICITY 1.5 MG/0.5ML Subcutaneous Solution Pen-injector [Pharmacy Med Name: TRULICITY 1.5 MG/0.5ML SUBCUTANEOUS SOLUTION PEN-INJECTOR] 6 mL 0     Sig: Inject 1 Dose into the skin once a week.       Diabetes Medication Protocol Failed - 3/22/2024  9:31 AM        Failed - Last A1C < 7.5 and within past 6 months     Lab Results   Component Value Date    A1C 8.2 (H) 01/09/2024             Passed - In person appointment or virtual visit in the past 6 mos or appointment in next 3 mos     Recent Outpatient Visits              2 months ago Uncontrolled type 2 diabetes mellitus with hyperglycemia ([facility]    Office Visit    5 months ago Acute cough    [facility]    Office Visit    7 months ago Uncontrolled type 2 diabetes mellitus with hyperglycemia ([facility])    [facility]    Office Visit    11 months ago Glaucoma suspect of both eyes    [facility]    Nurse Only    1 year ago Type 2 diabetes mellitus without retinopathy ([facility])    [facility] Marek Xavier MD    Office Visit                      Passed - Microalbumin procedure in past 12 months or taking ACE/ARB        Passed - EGFRCR or GFRNAA > 50     GFR Evaluation  EGFRCR: 97 , resulted on 1/9/2024          Passed - GFR in the past 12 months             Recent Outpatient Visits              2 months ago Uncontrolled type 2 diabetes mellitus with hyperglycemia ([facility])    [facility]    Office Visit    5 months ago Acute cough    [facility]    [facility] Visit    7  months ago Uncontrolled type 2 diabetes mellitus with hyperglycemia (HCC)    [facility] Britney Blankenship MD    Office Visit    11 months ago Glaucoma suspect of both eyes    [facility]    Nurse Only    1 year ago Type 2 diabetes mellitus without retinopathy ([facility])    [facility] Marek Xavier MD    Office Visit

## 2024-03-25 RX ORDER — DULAGLUTIDE 1.5 MG/.5ML
1 INJECTION, SOLUTION SUBCUTANEOUS WEEKLY
Qty: 6 ML | Refills: 1 | Status: SHIPPED | OUTPATIENT
Start: 2024-03-25

## 2024-04-17 ENCOUNTER — TELEPHONE (OUTPATIENT)
Dept: FAMILY MEDICINE CLINIC | Facility: CLINIC | Age: 64
End: 2024-04-17

## 2024-04-17 DIAGNOSIS — E11.65 UNCONTROLLED TYPE 2 DIABETES MELLITUS WITH HYPERGLYCEMIA (HCC): Primary | ICD-10-CM

## 2024-06-24 ENCOUNTER — NURSE TRIAGE (OUTPATIENT)
Dept: FAMILY MEDICINE CLINIC | Facility: CLINIC | Age: 64
End: 2024-06-24

## 2024-06-24 NOTE — TELEPHONE ENCOUNTER
Action Requested: Summary for Provider     []  Critical Lab, Recommendations Needed  [] Need Additional Advice  []   FYI    []   Need Orders  [] Need Medications Sent to Pharmacy  []  Other     SUMMARY: Patient requesting visit, okay with being seen tomorrow. Will bring log   Future Appointments   Date Time Provider Department Center   6/25/2024 11:30 AM [facility]   10/24/2024  2:00 PM Marek Xavier MD [facility]       Reason for call: Abnormal Result    Onset: n/a     Per patient blood glucose 200 fasting. Patient states she feels okay this morning. No abnormal symptoms noted. Levels have been more elevated lately.     Reason for Disposition   Patient wants to be seen    Protocols used: Diabetes - High Blood Sugar-A-OH

## 2024-06-25 ENCOUNTER — LAB ENCOUNTER (OUTPATIENT)
Dept: LAB | Age: 64
End: 2024-06-25
Attending: FAMILY MEDICINE

## 2024-06-25 ENCOUNTER — OFFICE VISIT (OUTPATIENT)
Dept: FAMILY MEDICINE CLINIC | Facility: CLINIC | Age: 64
End: 2024-06-25

## 2024-06-25 VITALS
HEIGHT: 58 IN | HEART RATE: 80 BPM | BODY MASS INDEX: 27.08 KG/M2 | DIASTOLIC BLOOD PRESSURE: 69 MMHG | SYSTOLIC BLOOD PRESSURE: 114 MMHG | WEIGHT: 129 LBS | OXYGEN SATURATION: 93 %

## 2024-06-25 DIAGNOSIS — E53.8 B12 DEFICIENCY: ICD-10-CM

## 2024-06-25 DIAGNOSIS — E11.65 UNCONTROLLED TYPE 2 DIABETES MELLITUS WITH HYPERGLYCEMIA (HCC): ICD-10-CM

## 2024-06-25 DIAGNOSIS — E11.65 UNCONTROLLED TYPE 2 DIABETES MELLITUS WITH HYPERGLYCEMIA (HCC): Primary | ICD-10-CM

## 2024-06-25 DIAGNOSIS — E78.00 HYPERCHOLESTEREMIA: ICD-10-CM

## 2024-06-25 LAB
ALBUMIN SERPL-MCNC: 4.5 G/DL (ref 3.2–4.8)
ALBUMIN/GLOB SERPL: 1.7 {RATIO} (ref 1–2)
ALP LIVER SERPL-CCNC: 87 U/L
ALT SERPL-CCNC: 65 U/L
ANION GAP SERPL CALC-SCNC: 8 MMOL/L (ref 0–18)
AST SERPL-CCNC: 59 U/L (ref ?–34)
BILIRUB SERPL-MCNC: 0.7 MG/DL (ref 0.2–1.1)
BUN BLD-MCNC: 12 MG/DL (ref 9–23)
BUN/CREAT SERPL: 17.4 (ref 10–20)
CALCIUM BLD-MCNC: 10 MG/DL (ref 8.7–10.4)
CHLORIDE SERPL-SCNC: 107 MMOL/L (ref 98–112)
CHOLEST SERPL-MCNC: 205 MG/DL (ref ?–200)
CO2 SERPL-SCNC: 26 MMOL/L (ref 21–32)
CREAT BLD-MCNC: 0.69 MG/DL
CREAT UR-SCNC: 92.9 MG/DL
EGFRCR SERPLBLD CKD-EPI 2021: 96 ML/MIN/1.73M2 (ref 60–?)
EST. AVERAGE GLUCOSE BLD GHB EST-MCNC: 177 MG/DL (ref 68–126)
FASTING PATIENT LIPID ANSWER: NO
FASTING STATUS PATIENT QL REPORTED: NO
GLOBULIN PLAS-MCNC: 2.7 G/DL (ref 2–3.5)
GLUCOSE BLD-MCNC: 136 MG/DL (ref 70–99)
HBA1C MFR BLD: 7.8 % (ref ?–5.7)
HDLC SERPL-MCNC: 78 MG/DL (ref 40–59)
LDLC SERPL CALC-MCNC: 108 MG/DL (ref ?–100)
MICROALBUMIN UR-MCNC: <0.3 MG/DL
NONHDLC SERPL-MCNC: 127 MG/DL (ref ?–130)
OSMOLALITY SERPL CALC.SUM OF ELEC: 294 MOSM/KG (ref 275–295)
POTASSIUM SERPL-SCNC: 4.4 MMOL/L (ref 3.5–5.1)
PROT SERPL-MCNC: 7.2 G/DL (ref 5.7–8.2)
SODIUM SERPL-SCNC: 141 MMOL/L (ref 136–145)
TRIGL SERPL-MCNC: 109 MG/DL (ref 30–149)
VIT B12 SERPL-MCNC: 467 PG/ML (ref 211–911)
VLDLC SERPL CALC-MCNC: 18 MG/DL (ref 0–30)

## 2024-06-25 PROCEDURE — 1125F AMNT PAIN NOTED PAIN PRSNT: CPT | Performed by: FAMILY MEDICINE

## 2024-06-25 PROCEDURE — 80061 LIPID PANEL: CPT

## 2024-06-25 PROCEDURE — 3008F BODY MASS INDEX DOCD: CPT | Performed by: FAMILY MEDICINE

## 2024-06-25 PROCEDURE — 82043 UR ALBUMIN QUANTITATIVE: CPT

## 2024-06-25 PROCEDURE — 1159F MED LIST DOCD IN RCRD: CPT | Performed by: FAMILY MEDICINE

## 2024-06-25 PROCEDURE — 3052F HG A1C>EQUAL 8.0%<EQUAL 9.0%: CPT | Performed by: FAMILY MEDICINE

## 2024-06-25 PROCEDURE — 99214 OFFICE O/P EST MOD 30 MIN: CPT | Performed by: FAMILY MEDICINE

## 2024-06-25 PROCEDURE — 3078F DIAST BP <80 MM HG: CPT | Performed by: FAMILY MEDICINE

## 2024-06-25 PROCEDURE — 36415 COLL VENOUS BLD VENIPUNCTURE: CPT

## 2024-06-25 PROCEDURE — 3074F SYST BP LT 130 MM HG: CPT | Performed by: FAMILY MEDICINE

## 2024-06-25 PROCEDURE — 82607 VITAMIN B-12: CPT

## 2024-06-25 PROCEDURE — 80053 COMPREHEN METABOLIC PANEL: CPT

## 2024-06-25 PROCEDURE — 82570 ASSAY OF URINE CREATININE: CPT

## 2024-06-25 PROCEDURE — 83036 HEMOGLOBIN GLYCOSYLATED A1C: CPT

## 2024-06-25 PROCEDURE — 1160F RVW MEDS BY RX/DR IN RCRD: CPT | Performed by: FAMILY MEDICINE

## 2024-06-25 RX ORDER — GLIPIZIDE 5 MG/1
TABLET ORAL
COMMUNITY
Start: 2024-04-24

## 2024-06-25 NOTE — PROGRESS NOTES
HPI:    Patient ID: Brooklyn Macias is a 64 year old female.      HPI    Chief Complaint   Patient presents with    Follow - Up     Follow up on diabetes, pt is fasting for blood work. Wants a Handicap sign she reports she struggles to walk due to knee pain        Wt Readings from Last 6 Encounters:   06/25/24 129 lb (58.5 kg)   01/09/24 127 lb (57.6 kg)   10/23/23 130 lb (59 kg)   08/21/23 128 lb (58.1 kg)   03/15/23 132 lb (59.9 kg)   01/24/23 134 lb (60.8 kg)     BP Readings from Last 3 Encounters:   06/25/24 114/69   01/09/24 103/65   10/23/23 104/67     Patient here for follow up   Second son just got  recently.    Feels weak at times.  Doesn't exercise    Feels well otherwise  Sometimes feels weak in legs       Review of Systems   Constitutional:  Negative for chills and fever.   Eyes:  Negative for visual disturbance.   Respiratory:  Negative for cough, shortness of breath and wheezing.    Cardiovascular:  Negative for chest pain, palpitations and leg swelling.   Genitourinary:  Negative for decreased urine volume and difficulty urinating.   Neurological:  Negative for dizziness, tremors, seizures, weakness, light-headedness, numbness and headaches.       /69 (BP Location: Right arm, Patient Position: Sitting, Cuff Size: adult)   Pulse 80   Ht 4' 10\" (1.473 m)   Wt 129 lb (58.5 kg)   SpO2 93%   BMI 26.96 kg/m²          Current Outpatient Medications   Medication Sig Dispense Refill    glipiZIDE 5 MG Oral Tab TAKE 1 TABLET ONCE DAILY IN THE MORNING WITH BREAKFAST AND ONE AND A HALF TABLET(S) IN THE EVENING      Continuous Glucose Monitor Sup Does not apply Kit 1 Device Every 2 (two) weeks. 1 kit 3    Dulaglutide (TRULICITY) 1.5 MG/0.5ML Subcutaneous Solution Pen-injector Inject 1 Dose into the skin once a week. 6 mL 1    ezetimibe 10 MG Oral Tab Take 1 tablet (10 mg total) by mouth daily. 90 tablet 3    lisinopril 5 MG Oral Tab Take 1 tablet (5 mg total) by mouth daily. 90 tablet 3     metFORMIN 500 MG Oral Tab Take 3 tablets (1,500 mg total) by mouth daily with breakfast. 270 tablet 3    levocetirizine 5 MG Oral Tab Take 1 tablet (5 mg total) by mouth every evening. 30 tablet 1    MICROLET LANCETS Does not apply Misc USE TWICE DAILY TO CHECK BLOOD GLUCOSE 200 each 0    Blood Glucose Monitoring Suppl ([employer] CONTOUR NEXT MONITOR) w/Device Does not apply Kit TEST UTD  0    CONTOUR NEXT TEST In Vitro Strip USE TEST STRIPS TWICE DAILY TO CHECK BLOOD GLUCOSE 200 strip 0    albuterol 108 (90 Base) MCG/ACT Inhalation Aero Soln Inhale 2 puffs into the lungs every 6 (six) hours as needed for Wheezing or Shortness of Breath. (Patient not taking: Reported on 1/9/2024) 1 each 0     Allergies:  Allergies   Allergen Reactions    Zetia [Ezetimibe] DIZZINESS    Atorvastatin RASH    Pravastatin TONGUE SWELLING      PHYSICAL EXAM:     Chief Complaint   Patient presents with    Follow - Up     Follow up on diabetes, pt is fasting for blood work. Wants a Handicap sign she reports she struggles to walk due to knee pain       Physical Exam  Vitals and nursing note reviewed.   Constitutional:       Appearance: She is well-developed.   Eyes:      Pupils: Pupils are equal, round, and reactive to light.   Neck:      Thyroid: No thyromegaly.   Cardiovascular:      Rate and Rhythm: Normal rate and regular rhythm.      Heart sounds: No murmur heard.  Pulmonary:      Effort: Pulmonary effort is normal.      Breath sounds: Normal breath sounds. No wheezing.   Abdominal:      Palpations: There is no mass.      Tenderness: There is no abdominal tenderness. There is no right CVA tenderness or left CVA tenderness.   Musculoskeletal:      Cervical back: Normal range of motion and neck supple.      Right lower leg: No edema.      Left lower leg: No edema.   Skin:     General: Skin is warm and dry.      Findings: No rash.   Neurological:      Mental Status: She is alert and oriented to person, place, and time.                 ASSESSMENT/PLAN:     Encounter Diagnoses   Name Primary?    Uncontrolled type 2 diabetes mellitus with hyperglycemia (HCC) Yes    Hypercholesteremia     B12 deficiency        1. Uncontrolled type 2 diabetes mellitus with hyperglycemia (HCC)  Check labs  Stable condition  Reviewed medications  Continue current medication management   All questions answered to the best of my ability.    - Hemoglobin A1C; Future  - Microalb/Creat Ratio, Random Urine; Future  - OPHTHALMOLOGY - INTERNAL  - Continuous Glucose Monitor Sup Does not apply Kit; 1 Device Every 2 (two) weeks.  Dispense: 1 kit; Refill: 3    2. Hypercholesteremia  Check labs  Stable condition  Reviewed medications  Continue current medication management   All questions answered to the best of my ability.    - Comp Metabolic Panel (14); Future  - Lipid Panel; Future    3. B12 deficiency    - Vitamin B12; Future      Orders Placed This Encounter   Procedures    Hemoglobin A1C    Microalb/Creat Ratio, Random Urine    Comp Metabolic Panel (14)    Lipid Panel    Vitamin B12         The above note was creating using Dragon speech recognition technology. Please excuse any typos    Meds This Visit:  Requested Prescriptions     Signed Prescriptions Disp Refills    Continuous Glucose Monitor Sup Does not apply Kit 1 kit 3     Si Device Every 2 (two) weeks.       Imaging & Referrals:  OPHTHALMOLOGY - INTERNAL       ID#[id number]

## 2024-06-30 DIAGNOSIS — R74.8 ELEVATED LIVER ENZYMES: Primary | ICD-10-CM

## 2024-06-30 RX ORDER — DULAGLUTIDE 1.5 MG/.5ML
1 INJECTION, SOLUTION SUBCUTANEOUS WEEKLY
Qty: 6 ML | Refills: 1 | Status: SHIPPED | OUTPATIENT
Start: 2024-06-30

## 2024-07-11 NOTE — TELEPHONE ENCOUNTER
Please review. Protocol Pass    Original rx written 2 with no refills.  Rx is pended, is refill appropriate?       Requested Prescriptions   Pending Prescriptions Disp Refills    FREESTYLE ELI 2 SENSOR Does not apply Misc [Pharmacy Med Name: FREESTYLE ELI 2 SENSOR] 2 each 0     Sig: USE AS DIRECTED       Diabetic Supplies Protocol Passed - 7/8/2024  4:36 PM        Passed - In person appointment or virtual visit in the past 12 mos or appointment in next 3 mos     Recent Outpatient Visits              2 weeks ago Uncontrolled type 2 diabetes mellitus with hyperglycemia (HCC)    [facility]    [facility]    6 months ago Uncontrolled type 2 diabetes mellitus with hyperglycemia [facility]    [facility]    8 months ago Acute cough    [facility]    Office Visit    10 months ago Uncontrolled type 2 diabetes mellitus with hyperglycemia ([facility])    [facility] Britney Blankenship MD    Office Visit    1 year ago Glaucoma suspect of both eyes    [facility]    Nurse Only          Future Appointments         Provider Department Appt Notes    In 2 weeks Britney Blankenship MD [facility], [address], [place] medicare wellness    In 1 month Sonia Urbina MD [facility], [facility] High blood sugars (policy informed)    In 3 months Marek Xavier MD [facility] ep dm ee, appt scheduled by pt's daughter, policy informed                           Future Appointments         Provider Department Appt Notes    In 2 weeks Britney Blankenship MD [facility], [address], [place] medicare wellness    In 1 month Sonia Urbina MD [place]  [facility], [place], [place] High blood sugars (policy informed)    In 3 months [facility], appt scheduled by pt's daughter, policy informed          Recent Outpatient Visits              2 weeks ago Uncontrolled type 2 diabetes mellitus with hyperglycemia (HCC)    [facility], Britney Lemus MD    Office Visit    6 months ago Uncontrolled type 2 diabetes mellitus with hyperglycemia (HCC)    [facility]    Office Visit    8 months ago Acute cough    [facility], Britney Lemus MD    Office Visit    10 months ago Uncontrolled type 2 diabetes mellitus with hyperglycemia (HCC)    [facility], Britney Lemus MD    Office Visit    1 year ago Glaucoma [facility] of both eyes    [facility]    [facility]

## 2024-07-11 NOTE — TELEPHONE ENCOUNTER
REFILL PASSED PER [facility] PROTOCOLS    Requested Prescriptions   Pending Prescriptions Disp Refills    FREESTYLE ELI 2 SENSOR Does not apply Misc [Pharmacy Med Name: FREESTYLE ELI 2 SENSOR] 2 each 0     Sig: USE AS DIRECTED       Diabetic Supplies Protocol Passed - 7/8/2024  4:36 PM        Passed - In person appointment or virtual visit in the past 12 mos or appointment in next 3 mos     Recent Outpatient Visits              2 weeks ago Uncontrolled type 2 diabetes mellitus with hyperglycemia ([facility]    Office Visit    6 months ago Uncontrolled type 2 diabetes mellitus with hyperglycemia ([facility])    [facility] Britney Lemus MD    Office Visit    8 months ago Acute cough    [facility] Britney Lemus MD    Office Visit    10 months ago Uncontrolled type 2 diabetes mellitus with hyperglycemia ([facility])    [facility] Britney Blankenship MD    Office Visit    1 year ago Glaucoma suspect of both eyes    [facility]    Nurse Only          Future Appointments         Provider Department Appt Notes    In 2 weeks Britney Blankenship MD [facility], [address], [place] medicare wellness    In 1 month [facility] ([facility] informed)    In 3 months Marek Xavier MD [facility] ep dm ee, appt scheduled by pt's daughter, policy informed                         Future Appointments         Provider Department Appt Notes    In 2 weeks Britney Blankenship MD [facility], [address], [place] medicare wellness    In 1 month Sonia Urbina MD [place], [place] High blood sugars  (policy informed)    In 3 months Marek Xavier MD [facility] ep dm ee, appt scheduled by pt's daughter, policy informed          Recent Outpatient Visits              2 weeks ago Uncontrolled type 2 diabetes mellitus with hyperglycemia (HCC)    [facility] [place] [place]    Office Visit    6 months ago Uncontrolled type 2 diabetes mellitus with hyperglycemia (HCC)    [facility], Britney Lemus MD    Office Visit    8 months ago Acute cough    [facility]    Office Visit    10 months ago Uncontrolled type 2 diabetes mellitus with hyperglycemia (HCC)    [facility], Britney Lemus MD    Office Visit    1 year ago Glaucoma suspect of both eyes    [facility]    Nurse Only

## 2024-07-29 RX ORDER — GLIPIZIDE 5 MG/1
TABLET ORAL
Qty: 135 TABLET | Refills: 1 | Status: SHIPPED | OUTPATIENT
Start: 2024-07-29

## 2024-07-29 NOTE — TELEPHONE ENCOUNTER
Please review; protocol failed/No Protocol    Medication is listed as patient reported.     Last written by Dr. Rome Huffman-sleep medicine physician     Requested Prescriptions   Pending Prescriptions Disp Refills    GLIPIZIDE 5 MG Oral Tab [Pharmacy Med Name: GLIPIZIDE 5 MG ORAL TABLET] 75 tablet 0     Sig: TAKE 1 TABLET ONCE DAILY IN THE MORNING WITH BREAKFAST AND ONE AND A HALF TABLET(S) IN THE EVENING       Diabetes Medication Protocol Failed - 7/24/2024  2:24 PM        Failed - Last A1C < 7.5 and within past 6 months     Lab Results   Component Value Date    A1C 7.8 (H) 06/25/2024             Passed - In person appointment or virtual visit in the past 6 mos or appointment in next 3 mos     Recent Outpatient Visits              1 month ago Uncontrolled type 2 diabetes mellitus with hyperglycemia (HCC)    [facility]    Office Visit    6 months ago Uncontrolled type 2 diabetes mellitus with hyperglycemia (HCC)    [facility]    [facility]    9 months ago [facility] cough    [facility]    Office Visit    11 months ago Uncontrolled type 2 diabetes mellitus with hyperglycemia (HCC)    [facility]    Office Visit    1 year ago Glaucoma suspect of both eyes    [facility]    Nurse Only          Future Appointments         Provider Department Appt Notes    Tomorrow Britney Blankenship MD [facility], [address], [place] medicare wellness    In 4 weeks Sonia Urbina MD [facility], [facility] High blood sugars (policy informed)    In 2 months Marek Xavier MD [facility] ep dm ee, appt scheduled by pt's daughter, policy informed                     Passed - Microalbumin procedure in past 12 months or taking ACE/ARB        Passed - EGFRCR or GFRNAA > 50     GFR Evaluation  EGFRCR: 96 , resulted on 6/25/2024          Passed - GFR in the past 12 months           Future Appointments         Provider Department Appt Notes    Tomorrow Britney Blankenship MD [facility], [address], [place] medicare wellness    In 4 weeks Sonia Urbina MD [facility], [facility] High blood sugars (policy informed)    In 2 months Marek Xavier MD [facility] ep dm ee, appt scheduled by pt's daughter, policy informed          Recent Outpatient Visits              1 month ago Uncontrolled type 2 diabetes mellitus with hyperglycemia (HCC)    [facility]    [facility] Visit    6 months ago Uncontrolled type 2 diabetes mellitus with hyperglycemia (HCC)    [facility]    [facility]    9 months ago Acute cough    [facility]    Office Visit    11 months ago Uncontrolled type 2 diabetes mellitus with hyperglycemia (HCC)    [facility]    Office Visit    1 year ago Glaucoma suspect of both eyes    [facility]    Nurse Only

## 2024-07-30 ENCOUNTER — LAB ENCOUNTER (OUTPATIENT)
Dept: LAB | Age: 64
End: 2024-07-30
Attending: FAMILY MEDICINE
Payer: MEDICARE

## 2024-07-30 ENCOUNTER — OFFICE VISIT (OUTPATIENT)
Dept: FAMILY MEDICINE CLINIC | Facility: CLINIC | Age: 64
End: 2024-07-30
Payer: COMMERCIAL

## 2024-07-30 VITALS
WEIGHT: 130 LBS | HEART RATE: 67 BPM | HEIGHT: 58 IN | DIASTOLIC BLOOD PRESSURE: 66 MMHG | BODY MASS INDEX: 27.29 KG/M2 | SYSTOLIC BLOOD PRESSURE: 102 MMHG

## 2024-07-30 DIAGNOSIS — Z01.419 ENCOUNTER FOR GYNECOLOGICAL EXAMINATION WITHOUT ABNORMAL FINDING: ICD-10-CM

## 2024-07-30 DIAGNOSIS — E78.00 HYPERCHOLESTEREMIA: ICD-10-CM

## 2024-07-30 DIAGNOSIS — E66.3 OVERWEIGHT (BMI 25.0-29.9): ICD-10-CM

## 2024-07-30 DIAGNOSIS — E55.9 VITAMIN D DEFICIENCY: ICD-10-CM

## 2024-07-30 DIAGNOSIS — E11.65 UNCONTROLLED TYPE 2 DIABETES MELLITUS WITH HYPERGLYCEMIA (HCC): ICD-10-CM

## 2024-07-30 DIAGNOSIS — R74.8 ELEVATED LIVER ENZYMES: ICD-10-CM

## 2024-07-30 DIAGNOSIS — Z00.00 ENCOUNTER FOR ANNUAL HEALTH EXAMINATION: Primary | ICD-10-CM

## 2024-07-30 DIAGNOSIS — E53.8 B12 DEFICIENCY: ICD-10-CM

## 2024-07-30 DIAGNOSIS — M15.0 PRIMARY OSTEOARTHRITIS INVOLVING MULTIPLE JOINTS: ICD-10-CM

## 2024-07-30 PROBLEM — H40.003 GLAUCOMA SUSPECT OF BOTH EYES: Status: RESOLVED | Noted: 2023-03-09 | Resolved: 2024-07-30

## 2024-07-30 PROBLEM — M21.969 FOOT DEFORMITY: Status: RESOLVED | Noted: 2017-02-08 | Resolved: 2024-07-30

## 2024-07-30 PROBLEM — J40 BRONCHITIS: Status: RESOLVED | Noted: 2017-01-01 | Resolved: 2024-07-30

## 2024-07-30 PROBLEM — M67.912 DISORDER OF LEFT ROTATOR CUFF: Status: RESOLVED | Noted: 2018-04-10 | Resolved: 2024-07-30

## 2024-07-30 PROBLEM — R73.9 HYPERGLYCEMIA: Status: RESOLVED | Noted: 2017-01-01 | Resolved: 2024-07-30

## 2024-07-30 PROBLEM — E11.9 TYPE 2 DIABETES MELLITUS WITHOUT RETINOPATHY (HCC): Status: RESOLVED | Noted: 2017-02-08 | Resolved: 2024-07-30

## 2024-07-30 PROBLEM — E66.9 OBESITY (BMI 30-39.9): Status: RESOLVED | Noted: 2019-05-02 | Resolved: 2024-07-30

## 2024-07-30 PROBLEM — R92.30 DENSE BREAST TISSUE ON MAMMOGRAM: Status: RESOLVED | Noted: 2018-02-22 | Resolved: 2024-07-30

## 2024-07-30 LAB
ALBUMIN SERPL-MCNC: 4.4 G/DL (ref 3.2–4.8)
ALP LIVER SERPL-CCNC: 92 U/L
ALT SERPL-CCNC: 54 U/L
AST SERPL-CCNC: 58 U/L (ref ?–34)
BILIRUB DIRECT SERPL-MCNC: 0.1 MG/DL (ref ?–0.3)
BILIRUB SERPL-MCNC: 0.4 MG/DL (ref 0.2–1.1)
PROT SERPL-MCNC: 7.3 G/DL (ref 5.7–8.2)

## 2024-07-30 PROCEDURE — 3051F HG A1C>EQUAL 7.0%<8.0%: CPT | Performed by: FAMILY MEDICINE

## 2024-07-30 PROCEDURE — 93005 ELECTROCARDIOGRAM TRACING: CPT

## 2024-07-30 PROCEDURE — G0438 PPPS, INITIAL VISIT: HCPCS | Performed by: FAMILY MEDICINE

## 2024-07-30 PROCEDURE — 1170F FXNL STATUS ASSESSED: CPT | Performed by: FAMILY MEDICINE

## 2024-07-30 PROCEDURE — 93010 ELECTROCARDIOGRAM REPORT: CPT | Performed by: STUDENT IN AN ORGANIZED HEALTH CARE EDUCATION/TRAINING PROGRAM

## 2024-07-30 PROCEDURE — 1159F MED LIST DOCD IN RCRD: CPT | Performed by: FAMILY MEDICINE

## 2024-07-30 PROCEDURE — 3008F BODY MASS INDEX DOCD: CPT | Performed by: FAMILY MEDICINE

## 2024-07-30 PROCEDURE — 1125F AMNT PAIN NOTED PAIN PRSNT: CPT | Performed by: FAMILY MEDICINE

## 2024-07-30 PROCEDURE — 36415 COLL VENOUS BLD VENIPUNCTURE: CPT

## 2024-07-30 PROCEDURE — 3074F SYST BP LT 130 MM HG: CPT | Performed by: FAMILY MEDICINE

## 2024-07-30 PROCEDURE — 1160F RVW MEDS BY RX/DR IN RCRD: CPT | Performed by: FAMILY MEDICINE

## 2024-07-30 PROCEDURE — 3061F NEG MICROALBUMINURIA REV: CPT | Performed by: FAMILY MEDICINE

## 2024-07-30 PROCEDURE — 96160 PT-FOCUSED HLTH RISK ASSMT: CPT | Performed by: FAMILY MEDICINE

## 2024-07-30 PROCEDURE — 80076 HEPATIC FUNCTION PANEL: CPT

## 2024-07-30 PROCEDURE — G0101 CA SCREEN;PELVIC/BREAST EXAM: HCPCS | Performed by: FAMILY MEDICINE

## 2024-07-30 PROCEDURE — 3078F DIAST BP <80 MM HG: CPT | Performed by: FAMILY MEDICINE

## 2024-07-30 RX ORDER — LISINOPRIL 5 MG/1
5 TABLET ORAL DAILY
Qty: 90 TABLET | Refills: 3 | Status: SHIPPED | OUTPATIENT
Start: 2024-07-30

## 2024-07-30 RX ORDER — GLIPIZIDE 5 MG/1
5 TABLET ORAL
Qty: 90 TABLET | Refills: 3 | Status: SHIPPED | OUTPATIENT
Start: 2024-07-30 | End: 2025-07-25

## 2024-07-30 NOTE — PROGRESS NOTES
Subjective:   Brooklyn Macias is a 64 year old female who presents for a MA AHA (Medicare Advantage Annual Health Assessment) and Medicare Wellness Visit charge within the last 11 months and Patient may not meet criteria for AWV: Please evaluate for correct coding and scheduled follow up of multiple significant but stable problems.     Wt Readings from Last 6 Encounters:   07/30/24 130 lb (59 kg)   06/25/24 129 lb (58.5 kg)   01/09/24 127 lb (57.6 kg)   10/23/23 130 lb (59 kg)   08/21/23 128 lb (58.1 kg)   03/15/23 132 lb (59.9 kg)     BP Readings from Last 3 Encounters:   07/30/24 102/66   06/25/24 114/69   01/09/24 103/65     Stressed about moving again , in rental unit    History/Other:   Fall Risk Assessment:   She has been screened for Falls and is High Risk. Fall Prevention information provided to patient in After Visit Summary.    Do you feel unsteady when standing or walking?: No  Do you worry about falling?: Yes  Have you fallen in the past year?: No     Cognitive Assessment:   Abnormal  What day of the week is this?: Correct  What month is it?: Correct  What year is it?: Correct  Recall \"Ball\": Incorrect  Recall \"Flag\": Incorrect  Recall \"Tree\": Incorrect    Functional Ability/Status:   Brooklyn Macias has some abnormal functions as listed below:  She has difficulties Managing Money/Bills based on screening of functional status.She has difficulties Shopping for Groceries based on screening of functional status. She has difficulties Affording Meds based on screening of functional status. She has problems with Memory based on screening of functional status.       Depression Screening (PHQ):  PHQ-2 SCORE: 0  , done 7/30/2024            Advanced Directives:   She does NOT have a Living Will. [Do you have a living will?: No]  She does NOT have a Power of  for Health Care. [Do you have a healthcare power of ?: No]  Discussed Advance Care Planning with patient (and family/surrogate if present).  Standard forms made available to patient in After Visit Summary.      Patient Active Problem List   Diagnosis    Uncontrolled type 2 diabetes mellitus with hyperglycemia (HCC)    Hypercholesteremia    Primary osteoarthritis involving multiple joints    B12 deficiency    Overweight (BMI 25.0-29.9)     Allergies:  She is allergic to zetia [ezetimibe], atorvastatin, and pravastatin.    Current Medications:  Outpatient Medications Marked as Taking for the 7/30/24 encounter (Office Visit) with Britney Blankenship MD   Medication Sig    glipiZIDE 5 MG Oral Tab Take 1 tablet (5 mg total) by mouth daily with breakfast.    metFORMIN HCl 1000 MG Oral Tab Take 1 tablet (1,000 mg total) by mouth 2 (two) times daily with meals.    lisinopril 5 MG Oral Tab Take 1 tablet (5 mg total) by mouth daily.    Continuous Glucose Sensor (FREESTYLE ELI 2 SENSOR) Does not apply Misc Take 1 each by mouth As Directed.    Dulaglutide (TRULICITY) 1.5 MG/0.5ML Subcutaneous Solution Pen-injector Inject 1 Dose into the skin once a week.    Continuous Glucose Monitor Sup Does not apply Kit 1 Device Every 2 (two) weeks.    ezetimibe 10 MG Oral Tab Take 1 tablet (10 mg total) by mouth daily.    levocetirizine 5 MG Oral Tab Take 1 tablet (5 mg total) by mouth every evening.    albuterol 108 (90 Base) MCG/ACT Inhalation Aero Soln Inhale 2 puffs into the lungs every 6 (six) hours as needed for Wheezing or Shortness of Breath.    MICROLET LANCETS Does not apply Misc USE TWICE DAILY TO CHECK BLOOD GLUCOSE    Blood Glucose Monitoring Suppl (MARY CONTOUR NEXT MONITOR) w/Device Does not apply Kit TEST UTD    CONTOUR NEXT TEST In Vitro Strip USE TEST STRIPS TWICE DAILY TO CHECK BLOOD GLUCOSE       Medical History:  She  has a past medical history of Diabetes (HCC), Diverticulitis, Hyperlipidemia, Shingles (09/04/2014), and Sleep apnea.  Surgical History:  She  has a past surgical history that includes jeffrey localization wire 1 site right (cpt=19281) and colonoscopy  screening - referral (N/A, 3/15/2023).   Family History:  Her family history includes Breast Cancer (age of onset: 40) in her paternal cousin female; Breast Cancer (age of onset: 70) in her paternal aunt; Cancer in an other family member; Diabetes in her brother and mother; Hypertension in her brother and father.  Social History:  She  reports that she has never smoked. She has never used smokeless tobacco. She reports that she does not drink alcohol and does not use drugs.    Tobacco:  She has never smoked tobacco.    CAGE Alcohol Screen:   CAGE screening score of 0 on 7/30/2024, showing low risk of alcohol abuse.      Patient Care Team:  Britney Blankenship MD as PCP - General (Family Medicine)    Review of Systems     Negative other than stress at home     Objective:   Physical Exam  General Appearance:  Alert, cooperative, no distress, appears stated age   Head:  Normocephalic, without obvious abnormality, atraumatic   Eyes:  PERRL, conjunctiva/corneas clear, EOM's intact both eyes   Ears:  Normal TM's and external ear canals, both ears   Nose: Nares normal, septum midline,mucosa normal, no drainage or sinus tenderness   Throat: Lips, mucosa, and tongue normal; teeth and gums normal   Neck: Supple, symmetrical, trachea midline, no adenopathy;  thyroid: not enlarged, symmetric, no tenderness/mass/nodules; no carotid bruit or JVD   Back:   Symmetric, no curvature, ROM normal, no CVA tenderness   Lungs:   Clear to auscultation bilaterally, respirations unlabored   Heart:  Regular rate and rhythm, S1 and S2 normal, no murmur, rub, or gallop   Abdomen:   Soft, non-tender, bowel sounds active all four quadrants,  no masses, no organomegaly   Pelvic: See below   Extremities: Extremities normal, atraumatic, no cyanosis or edema   Pulses: 2+ and symmetric   Skin: Skin color, texture, turgor normal, no rashes or lesions   Lymph nodes: Cervical, supraclavicular, and axillary nodes normal   Neurologic: Normal   , Breasts:  normal  appearance, no masses or tenderness, Inspection negative, No nipple retraction or dimpling, No nipple discharge or bleeding, No axillary or supraclavicular adenopathy, Normal to palpation without dominant masses, Taught monthly breast self examination, and Pelvic: cervix normal in appearance, external genitalia normal, no adnexal masses or tenderness, no cervical motion tenderness, rectovaginal septum normal, uterus normal size, shape, and consistency, and vagina normal without discharge    /66   Pulse 67   Ht 4' 10\" (1.473 m)   Wt 130 lb (59 kg)   BMI 27.17 kg/m²  Estimated body mass index is 27.17 kg/m² as calculated from the following:    Height as of this encounter: 4' 10\" (1.473 m).    Weight as of this encounter: 130 lb (59 kg).    Medicare Hearing Assessment:   Hearing Screening    Screening Method: Finger Rub  Finger Rub Result: Pass         Visual Acuity:   Right Eye Visual Acuity: Uncorrected Right Eye Chart Acuity: 20/15   Left Eye Visual Acuity: Uncorrected Left Eye Chart Acuity: 20/13   Both Eyes Visual Acuity: Uncorrected Both Eyes Chart Acuity: 20/13   Able To Tolerate Visual Acuity: Yes        Assessment & Plan:   Brooklyn Macias is a 64 year old female who presents for a Medicare Assessment.     1. Encounter for annual health examination (Primary)  2. Encounter for gynecological examination without abnormal finding  -     Breast and Pelvic Exam, Covered every 2 years []  3. Uncontrolled type 2 diabetes mellitus with hyperglycemia (HCC)  -     EKG 12 Lead; Future; Expected date: 07/30/2024  4. Hypercholesteremia  -     EKG 12 Lead; Future; Expected date: 07/30/2024  5. Vitamin D deficiency  6. B12 deficiency  7. Overweight (BMI 25.0-29.9)  8. Primary osteoarthritis involving multiple joints  Other orders  -     glipiZIDE; Take 1 tablet (5 mg total) by mouth daily with breakfast.  Dispense: 90 tablet; Refill: 3  -     metFORMIN HCl; Take 1 tablet (1,000 mg total) by mouth 2 (two) times  daily with meals.  Dispense: 180 tablet; Refill: 3  -     Lisinopril; Take 1 tablet (5 mg total) by mouth daily.  Dispense: 90 tablet; Refill: 3    1. Encounter for annual health examination      2. Encounter for gynecological examination without abnormal finding  Pelvic and breast exam done  - Breast and Pelvic Exam, Covered every 2 years []    3. Uncontrolled type 2 diabetes mellitus with hyperglycemia (HCC)  Reviewed medication  Had stopped jardiance as was urinating more.  Discussed medication and how it works.  She will try again  Push fluids  - EKG 12 Lead; Future    4. Hypercholesteremia  Goal ldl lower  Cut out butter  Follow up 3 months  - EKG 12 Lead; Future    5. Vitamin D deficiency  replace    6. B12 deficiency  replace    7. Overweight (BMI 25.0-29.9)  Wt Readings from Last 6 Encounters:   07/30/24 130 lb (59 kg)   06/25/24 129 lb (58.5 kg)   01/09/24 127 lb (57.6 kg)   10/23/23 130 lb (59 kg)   08/21/23 128 lb (58.1 kg)   03/15/23 132 lb (59.9 kg)       Highly recommend to lose weight.  Discussed good dietary and eating habits as well as increasing vegetable and fruit intake.  Recommending avoiding foods high in fat content.  Recommend exercising at least 30-40 minutes 5-6 days a week.  Avoid skipping meals.  Making healthy choices for snacks and also limiting sugary beverages.      8. Primary osteoarthritis involving multiple joints  Tylenol as needed     The patient indicates understanding of these issues and agrees to the plan.  Continue with current treatment plan.  Further testing ordered.  Prescription medication ordered.  Reinforced healthy diet, lifestyle, and exercise.      Return for dm / chol .     Britney Blankenship MD, 7/30/2024     Supplementary Documentation:   General Health:  In the past six months, have you lost more than 10 pounds without trying?: 2 - No  Has your appetite been poor?: No  Type of Diet: Low Carb  How does the patient maintain a good energy level?: Other  How would  you describe your daily physical activity?: Light  How would you describe your current health state?: Fair  How do you maintain positive mental well-being?: Visiting Family;Visiting Friends  On a scale of 0 to 10, with 0 being no pain and 10 being severe pain, what is your pain level?: 6 - (Moderate) (joinyd)  In the past six months, have you experienced urine leakage?: 0-No  At any time do you feel concerned for the safety/well-being of yourself and/or your children, in your home or elsewhere?: Yes  Have you had any immunizations at another office such as Influenza, Hepatitis B, Tetanus, or Pneumococcal?: Yes    Health Maintenance   Topic Date Due    Zoster Vaccines (1 of 2) Never done    COVID-19 Vaccine (2 - 2023-24 season) 09/01/2023    MA Annual Health Assessment  Never done    Mammogram  02/10/2024    Diabetes Care Dilated Eye Exam  03/09/2024    Influenza Vaccine (1) 10/01/2024    Diabetes Care A1C  12/25/2024    Diabetes Care Foot Exam  01/09/2025    Diabetes Care: GFR  06/25/2025    Diabetes Care: Microalb/Creat Ratio  06/25/2025    Colorectal Cancer Screening  03/15/2033    DEXA Scan  Completed    Annual Depression Screening  Completed    Fall Risk Screening (Annual)  Completed    Pneumococcal Vaccine: 65+ Years  Completed

## 2024-07-31 DIAGNOSIS — R94.31 ABNORMAL EKG: Primary | ICD-10-CM

## 2024-07-31 LAB
ATRIAL RATE: 84 BPM
P AXIS: 49 DEGREES
P-R INTERVAL: 180 MS
Q-T INTERVAL: 368 MS
QRS DURATION: 62 MS
QTC CALCULATION (BEZET): 434 MS
R AXIS: -19 DEGREES
T AXIS: 65 DEGREES
VENTRICULAR RATE: 84 BPM

## 2024-08-05 DIAGNOSIS — R74.8 ELEVATED LIVER ENZYMES: Primary | ICD-10-CM

## 2024-08-20 ENCOUNTER — HOSPITAL ENCOUNTER (OUTPATIENT)
Dept: MAMMOGRAPHY | Age: 64
Discharge: HOME | End: 2024-08-20
Attending: FAMILY MEDICINE
Payer: MEDICARE

## 2024-08-20 DIAGNOSIS — Z12.31 ENCOUNTER FOR SCREENING MAMMOGRAM FOR BREAST CANCER: ICD-10-CM

## 2024-08-20 DIAGNOSIS — R92.333 HETEROGENEOUSLY DENSE TISSUE OF BOTH BREASTS ON MAMMOGRAPHY: Primary | ICD-10-CM

## 2024-08-20 PROCEDURE — 77063 BREAST TOMOSYNTHESIS BI: CPT | Performed by: FAMILY MEDICINE

## 2024-08-20 PROCEDURE — 77067 SCR MAMMO BI INCL CAD: CPT | Performed by: FAMILY MEDICINE

## 2024-08-24 NOTE — H&P
Name: Brooklyn Macias  Date: 8/26/2024    Referring Physician: No ref. provider found    HISTORY OF PRESENT ILLNESS   Brooklyn Macias is a 64 year old female who presents for evaluation and management of type 2 diabetes. He was diagnosed with diabetes about 20 years ago.     Blood Glucose Today: 255  HbA1C or glycohemoglobin  8.0 today (and it was 7.8 on 6/25/24)  Type 1 or Type 2?: Type 2  Medications for DM: Trulicity 1.5mg qweekly; metformin 1000mg PO bid; Glipizide 5mg PO qAM; Jardiance 10mg PO twice weekly.    Checking 1 times per day (she used to have a CGMS, but does not have this anymore)  Fasting- 140-160  2 hours after eating- greater than 200 but less than 300.   Episodes of hypoglycemia: occasionally she has     Dietary compliance: Fair  Breakfast: 1 slice white bread with tea (monkfruit or honey or brown sugar); okra or karela; with roti or adha paratha, and occasionally has a tablespoon of olive oil and sometimes fish oil. Or one egg or olives. Or fruit (apples or peaches or watermelon, pomegranate) (10AM)    Lunch: usually light, because breakfast is heavy); some chaat with shannon, luis, rice, salad, cucumber    Dinner: edith conrad,     Exercise: She is not exercising    Polyuria/polydipsia: none    Blurred vision: none    Flu Vaccine This Season: yes    Covid Vaccine: yes    REVIEW OF SYSTEMS  CV: Cardiovascular disease present: none         Hypertension present: at goal, on meds         Hyperlipidemia present: not at goal, on ezetemibe         Peripheral Vascular Disease present: none    : Nephropathy present: MAC: none (6/25/24) Creatinine: 0.69      Neuro: Neuropathy present: none    Eyes: Diabetic retinopathy present: unknown            Most recent visit to eye doctor in last 12 months: unknown    Skin: Infection or ulceration: none    Osteoporosis/ Osteopenia: none (1/2024) Vitamin D: 68.5 (1/2024)    Thyroid disease: none TSH: none    Medications:     Current Outpatient Medications:      glipiZIDE 5 MG Oral Tab, Take 1 tablet (5 mg total) by mouth daily with breakfast., Disp: 90 tablet, Rfl: 3    metFORMIN HCl 1000 MG Oral Tab, Take 1 tablet (1,000 mg total) by mouth 2 (two) times daily with meals., Disp: 180 tablet, Rfl: 3    Dulaglutide (TRULICITY) 1.5 MG/0.5ML Subcutaneous Solution Pen-injector, Inject 1 Dose into the skin once a week., Disp: 6 mL, Rfl: 1    lisinopril 5 MG Oral Tab, Take 1 tablet (5 mg total) by mouth daily., Disp: 90 tablet, Rfl: 3    Continuous Glucose Sensor (FREESTYLE ELI 2 SENSOR) Does not apply Misc, Take 1 each by mouth As Directed., Disp: 6 each, Rfl: 3    empagliflozin (JARDIANCE) 10 MG Oral Tab, Take 1 tablet (10 mg total) by mouth daily. (Patient not taking: Reported on 7/30/2024), Disp: 90 tablet, Rfl: 1    Continuous Glucose Monitor Sup Does not apply Kit, 1 Device Every 2 (two) weeks., Disp: 1 kit, Rfl: 3    ezetimibe 10 MG Oral Tab, Take 1 tablet (10 mg total) by mouth daily., Disp: 90 tablet, Rfl: 3    levocetirizine 5 MG Oral Tab, Take 1 tablet (5 mg total) by mouth every evening., Disp: 30 tablet, Rfl: 1    albuterol 108 (90 Base) MCG/ACT Inhalation Aero Soln, Inhale 2 puffs into the lungs every 6 (six) hours as needed for Wheezing or Shortness of Breath., Disp: 1 each, Rfl: 0    MICROLET LANCETS Does not apply Misc, USE TWICE DAILY TO CHECK BLOOD GLUCOSE, Disp: 200 each, Rfl: 0    Blood Glucose Monitoring Suppl (MARY CONTOUR NEXT MONITOR) w/Device Does not apply Kit, TEST UTD, Disp: , Rfl: 0    CONTOUR NEXT TEST In Vitro Strip, USE TEST STRIPS TWICE DAILY TO CHECK BLOOD GLUCOSE, Disp: 200 strip, Rfl: 0     Allergies:   Allergies   Allergen Reactions    Zetia [Ezetimibe] DIZZINESS    Atorvastatin RASH    Pravastatin TONGUE SWELLING       Social History:   Social History     Socioeconomic History    Marital status:    Tobacco Use    Smoking status: Never    Smokeless tobacco: Never   Vaping Use    Vaping status: Never Used   Substance and Sexual  Activity    Alcohol use: No    Drug use: No       Medical History:   Past Medical History:    Diabetes (HCC)    Diverticulitis    Hyperlipidemia    Shingles    Sleep apnea       Surgical history:   Past Surgical History:   Procedure Laterality Date    Colonoscopy screening - referral N/A 3/15/2023    Procedure: COLONOSCOPY-SCREENING;  Surgeon: Concepcion Lester MD;  [facility] ENDOSCOPY    Liliana localization wire 1 site right (cpt=19281)           PHYSICAL EXAM  Vitals:    08/26/24 1028   BP: 107/60   Pulse: 88   Weight: 131 lb 3.2 oz (59.5 kg)   Height: 4' 10\" (1.473 m)       General Appearance:  alert, well developed, in no acute distress  Eyes:  normal conjunctivae, sclera., normal sclera and normal pupils  Psychiatric:  oriented to time, self, and place  Nutritional:  no abnormal weight gain or loss    Lab Data:   Lab Results   Component Value Date     (H) 06/25/2024    A1C 8.0 (A) 08/26/2024     Lab Results   Component Value Date     (H) 06/25/2024    BUN 12 06/25/2024    BUNCREA 17.4 06/25/2024    CREATSERUM 0.69 06/25/2024    ANIONGAP 8 06/25/2024    GFRNAA 96 05/02/2019    GFRAA 110 05/02/2019    CA 10.0 06/25/2024    OSMOCALC 294 06/25/2024    ALKPHO 92 07/30/2024    AST 58 (H) 07/30/2024    ALT 54 (H) 07/30/2024    ALKPHOS 68 10/12/2015    BILT 0.4 07/30/2024    TP 7.3 07/30/2024    ALB 4.4 07/30/2024    GLOBULIN 2.7 06/25/2024    AGRATIO 1.1 10/12/2015     06/25/2024    K 4.4 06/25/2024     06/25/2024    CO2 26.0 06/25/2024     Lab Results   Component Value Date    CHOLEST 205 (H) 06/25/2024    TRIG 109 06/25/2024    HDL 78 (H) 06/25/2024     (H) 06/25/2024    VLDL 18 06/25/2024    NONHDLC 127 06/25/2024    CALCNONHDL 150 (H) 09/27/2016     Lab Results   Component Value Date    MALBP <0.30 06/25/2024    CREUR 92.90 06/25/2024    CREAURINE 75.7 05/02/2016    MIALBURINE 1.5 05/02/2016    MCRRATIOUR 19.8 05/02/2016         ASSESSMENT/PLAN:  This is a 64 year-old woman here for  evaluation and management of uncontrolled type 2 diabetes. We discussed the ABCs of DM.     1.) Hyperglycemia Management- We discussed the importance of glycemic control to prevent complications of diabetes. We discussed the importance of SBGM. I offered and provided patient education materials and offered a blood glucose log book.   - Continue metformin 1g PO bid  - Increase Trulicity 1.5mg to 3.0mg qweekly  - Stop glipizide and Jardiance  - Start checking blood sugars fasting and two hours after biggest meal    2.) Management of Diabetic Complications- We discussed the complications of diabetes include retinopathy, neuropathy, nephropathy and cardiovascular disease.   - Ophtho- I will follow up at the next visit  - Flu and Covid vaccine- up to date  - BP- at goal, will stop the lisinopril and follow up at next visit  - Lipids- stop ezetemibe and check in 6 weeks  - MAC- check in 6 weeks  - CMP- check in 6 weeks- lfts were elevated  - Neuropathy- treated  - CAD- none    3.) Lifestyle Management for Diabetes- We discussed importance of a low CHO diet, and recommend 45gm per meal or 135gm per day. We discussed the importance of trying to follow a Mediterranean diet, with an emphasis on vegetables at every meal, with lots whole grains, and protein from either plant-based sources, or poultry and fish.   - Diet- gave handout for eating urbano food with diabetes  - Exercise- she will start to walk for 5-10 minutes daily    Return to clinic in 6 weeks    Prior to this encounter, I spent over 20 minutes with preparing for the visit, reviewing documents from other specialties as well as from PCP, and going over test results and imaging studies. During the face to face encounter, I spent an additional 30 minutes which were determined for history-taking, physical examination, and orientation regarding our services. Greater than 50% of the time was spent in counseling, anticipatory guidance, and coordination of care. Patient  concerns were answered to the best of my knowledge.          8/24/2024  Sonia Urbina MD

## 2024-08-26 ENCOUNTER — OFFICE VISIT (OUTPATIENT)
Dept: ENDOCRINOLOGY CLINIC | Facility: CLINIC | Age: 64
End: 2024-08-26

## 2024-08-26 VITALS
HEART RATE: 88 BPM | BODY MASS INDEX: 27.54 KG/M2 | HEIGHT: 58 IN | WEIGHT: 131.19 LBS | DIASTOLIC BLOOD PRESSURE: 60 MMHG | SYSTOLIC BLOOD PRESSURE: 107 MMHG

## 2024-08-26 DIAGNOSIS — R74.8 ELEVATED LIVER ENZYMES: ICD-10-CM

## 2024-08-26 DIAGNOSIS — E78.5 DYSLIPIDEMIA: ICD-10-CM

## 2024-08-26 DIAGNOSIS — E11.65 UNCONTROLLED TYPE 2 DIABETES MELLITUS WITH HYPERGLYCEMIA (HCC): Primary | ICD-10-CM

## 2024-08-26 LAB
CARTRIDGE EXPIRATION DATE: ABNORMAL DATE
GLUCOSE BLOOD: 255
HEMOGLOBIN A1C: 8 % (ref 4.3–5.6)
TEST STRIP LOT #: NORMAL NUMERIC

## 2024-08-26 RX ORDER — DULAGLUTIDE 3 MG/.5ML
3 INJECTION, SOLUTION SUBCUTANEOUS WEEKLY
Qty: 6 ML | Refills: 0 | Status: SHIPPED | OUTPATIENT
Start: 2024-08-26 | End: 2024-11-24

## 2024-09-25 RX ORDER — EMPAGLIFLOZIN 10 MG/1
10 TABLET, FILM COATED ORAL DAILY
Qty: 90 TABLET | Refills: 1 | OUTPATIENT
Start: 2024-09-25

## 2024-09-26 DIAGNOSIS — E11.65 UNCONTROLLED TYPE 2 DIABETES MELLITUS WITH HYPERGLYCEMIA (HCC): ICD-10-CM

## 2024-10-01 RX ORDER — BLOOD SUGAR DIAGNOSTIC
STRIP MISCELLANEOUS
Qty: 200 EACH | Refills: 3 | Status: SHIPPED | OUTPATIENT
Start: 2024-10-01

## 2024-10-01 NOTE — TELEPHONE ENCOUNTER
Please review. Protocol Pass    Please advise if refill appropriate. Last written: 7/13/2018    Requested Prescriptions   Pending Prescriptions Disp Refills    ONETOUCH ULTRA In Vitro Strip [Pharmacy Med Name: ONETOUCH ULTRA IN VITRO STRIP] 100 each 0     Sig: USE TO TEST BID.       Diabetic Supplies Protocol Passed - 9/26/2024  2:15 PM        Passed - In person appointment or virtual visit in the past 12 mos or appointment in next 3 mos     Recent Outpatient Visits              1 month ago Uncontrolled type 2 diabetes mellitus with hyperglycemia (HCC)    [facility]    Office Visit    2 months ago Encounter for annual health examination    [facility] [place] [facility] [facility]    Office Visit    3 months ago Uncontrolled type 2 diabetes mellitus with hyperglycemia (HCC)    [facility]    Office Visit    8 months ago Uncontrolled type 2 diabetes mellitus with hyperglycemia ([facility]    [facility] [place] [facility]    Office Visit    11 months ago Acute cough    [facility] [place] [place] [facility]    Office Visit          Future Appointments         Provider Department Appt Notes    In 4 weeks Britney Blankenship MD [facility] follow up    In 1 month [facility]     In 10 months Britney Blankenship MD [facility], [address], [place] medicare wellness                     Refused Prescriptions Disp Refills    METFORMIN 500 MG Oral Tab [Pharmacy Med Name: METFORMIN  MG ORAL TABLET] 270 tablet 3     Sig: TAKE 3 TABLETS (1,500 MG TOTAL) BY MOUTH DAILY WITH BREAKFAST.       Diabetes Medication Protocol Failed - 9/26/2024  2:15 PM        Failed -  Last A1C < 7.5 and within past 6 months     Lab Results   Component Value Date    A1C 8.0 (A) 08/26/2024             Passed - In person appointment or virtual visit in the past 6 mos or appointment in next 3 mos     Recent Outpatient Visits              1 month ago Uncontrolled type 2 diabetes mellitus with hyperglycemia (HCC)    [facility] [place]    Office Visit    2 months ago Encounter for annual health examination    [facility]    Office Visit    3 months ago Uncontrolled type 2 diabetes mellitus with hyperglycemia ([facility])    [facility]    Office Visit    8 months ago Uncontrolled type 2 diabetes mellitus with hyperglycemia ([facility])    [facility]    Office Visit    11 months ago Acute cough    [facility] Britney Blankenship MD    Office Visit          Future Appointments         Provider Department Appt Notes    In 4 weeks [facility]    In 1 month [facility] [place] [place]     In 10 months Brit[facility] Blankenship [facility]                    Passed - Microalbumin procedure in past 12 months or taking ACE/ARB        Passed - EGFRCR or GFRNAA > 50     GFR Evaluation  EGFRCR: 96 , resulted on 6/25/2024          Passed - GFR in the past 12 months               Future Appointments         Provider Department Appt Notes    In 4 weeks Brit[facility] Blank[facility]hip [facility]    In 1 month [place]onia [place]ina [place] [facility] [place]     In 10 months [facility] [place] medicare wellness          Recent Outpatient Visits              1 month ago Uncontrolled type 2 diabetes mellitus with hyperglycemia (HCC)    [facility] [place] Sudha K, [place]    Office Visit    2 months ago Encounter for annual health examination    [facility] Britney Lemus MD    Office Visit    3 months ago Uncontrolled type 2 diabetes mellitus with hyperglycemia (HCC)    [facility]    Office Visit    8 months ago Uncontrolled type 2 diabetes mellitus with hyperglycemia (HCC)    [facility]    Office Visit    11 months ago Acute cough    [facility]    [facility] Visit

## 2024-10-11 NOTE — TELEPHONE ENCOUNTER
Please review pended refill request as unable to refill due to high/very high interaction warning copied here:    High  Allergy/Contraindication: ezetimibeReactions: DIZZINESS. No reaction type specified. User documented allergy severity: Medium.  Level 1 with EZETIM    Requested Prescriptions   Pending Prescriptions Disp Refills    EZETIMIBE 10 MG Oral Tab [Pharmacy Med Name: EZETIMIBE 10 MG ORAL TABLET] 90 tablet 3     Sig: Take 1 tablet (10 mg total) by mouth daily.       Cholesterol Medication Protocol Passed - 10/11/2024  9:47 AM        Passed - ALT < 80     Lab Results   Component Value Date    ALT 54 (H) 07/30/2024             Passed - ALT resulted within past year        Passed - Lipid panel within past 12 months     Lab Results   Component Value Date    CHOLEST 205 (H) 06/25/2024    TRIG 109 06/25/2024    HDL 78 (H) 06/25/2024     (H) 06/25/2024    VLDL 18 06/25/2024    NONHDLC 127 06/25/2024             Passed - In person appointment or virtual visit in the past 12 mos or appointment in next 3 mos     Recent Outpatient Visits              1 month ago Uncontrolled type 2 diabetes mellitus with hyperglycemia (HCC)    [facility]    Office Visit    2 months ago Encounter for annual health examination    [facility]    Office Visit    3 months ago Uncontrolled type 2 diabetes mellitus with hyperglycemia (HCC)    [facility]    Office Visit    9 months ago Uncontrolled type 2 diabetes mellitus with hyperglycemia (HCC)    [facility]    Office Visit    11 months ago Acute cough    [facility]    Office Visit          Future Appointments         Provider Department Appt Notes    In 2 weeks Pattie  Britney MOLINA MD [facility], [facility] follow up    In 1 month Sonia Urbina MD [facility], [facility] Diabetes Follow Up    In 9 months Britney Blankenship MD [facility], [address], [place] [facility]                           Future Appointments         Provider Department Appt Notes    In 2 weeks Britney Blankenship MD [facility], [facility] follow up    In 1 month Sonia Urbina MD [facility], [facility] Diabetes Follow Up    In 9 months Britney Blankenship MD [facility], [address], [place] [facility]          Recent Outpatient Visits              1 month ago Uncontrolled type 2 diabetes mellitus with hyperglycemia (HCC)    [facility] Sonia Urbina MD    Office Visit    2 months ago Encounter for annual health examination    [facility]    Office Visit    3 months ago Uncontrolled type 2 diabetes mellitus with hyperglycemia (HCC)    [facility] Britney Blankenship MD    Office Visit    9 months ago Uncontrolled type 2 diabetes mellitus with hyperglycemia (HCC)    [facility]    Office Visit    11 months ago Acute cough    [facility] Britney Blankenship MD    Office Visit

## 2024-10-12 RX ORDER — EZETIMIBE 10 MG/1
10 TABLET ORAL DAILY
Qty: 90 TABLET | Refills: 3 | Status: SHIPPED | OUTPATIENT
Start: 2024-10-12

## 2024-10-14 DIAGNOSIS — E11.65 UNCONTROLLED TYPE 2 DIABETES MELLITUS WITH HYPERGLYCEMIA (HCC): ICD-10-CM

## 2024-10-14 RX ORDER — DULAGLUTIDE 3 MG/.5ML
3 INJECTION, SOLUTION SUBCUTANEOUS WEEKLY
Qty: 6 ML | Refills: 0 | Status: SHIPPED | OUTPATIENT
Start: 2024-10-14 | End: 2025-01-12

## 2024-10-14 NOTE — TELEPHONE ENCOUNTER
Endocrine Refill protocol for oral and injectable diabetic medications    Protocol Criteria:  PASSED  Reason: N/A    If all below requirements are met, send a 90-day supply with 1 refill per provider protocol.    Verify appointment with Endocrinology completed in the last 6 months or scheduled in the next 3 months.  Verify A1C has been completed within the last 6 months and is below 8.5%     Last completed office visit: 8/26/2024 Sonia Urbina MD   Next scheduled Follow up:   Future Appointments   Date Time Provider Department Center   10/29/2024  1:00 PM [facility]   11/21/2024 12:00 PM Sonia Urbina MD [facility]   7/29/2025  1:00 PM Britney Blankenship MD [facility]      Last A1c result: Last A1c value was 8% done 8/26/2024.

## 2024-12-26 DIAGNOSIS — E11.65 UNCONTROLLED TYPE 2 DIABETES MELLITUS WITH HYPERGLYCEMIA (HCC): ICD-10-CM

## 2024-12-31 RX ORDER — GLIPIZIDE 5 MG/1
TABLET ORAL
Qty: 135 TABLET | Refills: 0 | OUTPATIENT
Start: 2024-12-31

## 2024-12-31 RX ORDER — EMPAGLIFLOZIN 10 MG/1
10 TABLET, FILM COATED ORAL DAILY
Qty: 90 TABLET | Refills: 1 | OUTPATIENT
Start: 2024-12-31

## 2024-12-31 NOTE — TELEPHONE ENCOUNTER
Office visit with Dr. Sonia Villanueva on 08/26/2024:  1.) Hyperglycemia Management- We discussed the importance of glycemic control to prevent complications of diabetes. We discussed the importance of SBGM. I offered and provided patient education materials and offered a blood glucose log book.   - Continue metformin 1g PO bid  - Increase Trulicity 1.5mg to 3.0mg qweekly  - Stop glipizide and Jardiance  - Start checking blood sugars fasting and two hours after biggest meal      Metformin 500mg: patients dose is 1000mg twice daily per office visit on 08/26/2024    Jardiance 10mg: per office visit patient was to stop on 08/26/2024    Glipizide 5mg: per office visit patient was to stop on 08/26/2024

## 2025-01-07 NOTE — TELEPHONE ENCOUNTER
Please review; protocol failed/ has no protocol    Requested Prescriptions   Pending Prescriptions Disp Refills    METFORMIN HCL 1000 MG Oral Tab [Pharmacy Med Name: METFORMIN HCL 1000 MG ORAL TABLET] 180 tablet 3     Sig: Take 1 tablet (1,000 mg total) by mouth 2 (two) times daily with meals.       Diabetes Medication Protocol Failed - 1/7/2025  9:28 AM        Failed - Last A1C < 7.5 and within past 6 months     Lab Results   Component Value Date    A1C 8.0 (A) 08/26/2024             Passed - In person appointment or virtual visit in the past 6 mos or appointment in next 3 mos     Recent Outpatient Visits              4 months ago Uncontrolled type 2 diabetes mellitus with hyperglycemia (HCC)    [facility]    Office Visit    5 months ago Encounter for annual health examination    [facility] Britney Blankenship MD    Office Visit    6 months ago Uncontrolled type 2 diabetes mellitus with hyperglycemia (HCC)    [facility]    Office Visit    12 months ago Uncontrolled type 2 diabetes mellitus with hyperglycemia (HCC)    [facility]    Office Visit    1 year ago Acute cough    [facility]    [facility] Visit          Future Appointments         Provider Department Appt Notes    In 1 month [facility] - pt aware to obtain referral from PCP 11/18/2024 jmb    In 6 months Britney Blankenship MD [facility], [address], [place] medicare wellness                    Passed - Microalbumin procedure in past 12 months or taking ACE/ARB        Passed - EGFRCR or GFRNAA > 50     GFR Evaluation  EGFRCR: 96 , resulted on 6/25/2024           Passed - GFR in the past 12 months           Recent Outpatient Visits              4 months ago Uncontrolled type 2 diabetes mellitus with hyperglycemia (HCC)    [facility] Sudha K, MD    Office Visit    5 months ago Encounter for annual health examination    [facility]    Office Visit    6 months ago Uncontrolled type 2 diabetes mellitus with hyperglycemia ([facility])    [facility]    Office Visit    12 months ago Uncontrolled type 2 diabetes mellitus with hyperglycemia (HCC)    [facility]    Office Visit    1 year ago Acute cough    [facility]    Office Visit          Future Appointments         Provider Department Appt Notes    In 1 month Sonia Urbina MD [facility] Diabetes Follow Up - pt aware to obtain referral from PCP 11/18/2024 vania    In 6 months [facility]

## 2025-03-19 DIAGNOSIS — E11.65 UNCONTROLLED TYPE 2 DIABETES MELLITUS WITH HYPERGLYCEMIA (HCC): ICD-10-CM

## 2025-03-20 RX ORDER — DULAGLUTIDE 3 MG/.5ML
3 INJECTION, SOLUTION SUBCUTANEOUS
Qty: 6 ML | Refills: 0 | Status: SHIPPED | OUTPATIENT
Start: 2025-03-20

## 2025-03-20 RX ORDER — EZETIMIBE 10 MG/1
10 TABLET ORAL DAILY
Qty: 90 TABLET | Refills: 2 | Status: SHIPPED | OUTPATIENT
Start: 2025-03-20

## 2025-03-20 RX ORDER — LISINOPRIL 5 MG/1
5 TABLET ORAL DAILY
Qty: 90 TABLET | Refills: 1 | Status: SHIPPED | OUTPATIENT
Start: 2025-03-20

## 2025-03-20 RX ORDER — GLIPIZIDE 5 MG/1
5 TABLET ORAL
Qty: 90 TABLET | Refills: 3 | OUTPATIENT
Start: 2025-03-20 | End: 2026-03-15

## 2025-03-20 RX ORDER — LISINOPRIL 5 MG/1
5 TABLET ORAL DAILY
Qty: 90 TABLET | Refills: 3 | OUTPATIENT
Start: 2025-03-20

## 2025-03-20 NOTE — TELEPHONE ENCOUNTER
Endocrine Refill protocol for oral and injectable diabetic medications    Protocol Criteria:  FAILED  Reason: No labs completed in required time frame    If all below requirements are met, send a 90-day supply with 1 refill per provider protocol.    Verify appointment with Endocrinology completed in the last 6 months or scheduled in the next 3 months.  Verify A1C has been completed within the last 6 months and is below 8.5%     Last completed office visit: 8/26/2024 Sonia Urbina MD   Next scheduled Follow up:   Future Appointments   Date Time Provider Department Center   7/29/2025  1:00 PM Britney Blankenship MD [facility]      Last A1c result: Last A1c value was 8% done 8/26/2024.      Problem: Falls - Risk of:  Goal: Will remain free from falls  Description: Will remain free from falls  Outcome: Ongoing  Note: Falling star placed outside of patient's room. 2/4 bed rails up. Non-skid socks provided. Call light and personal items with in reach. Bed alarm on. Problem: Skin Integrity:  Goal: Absence of new skin breakdown  Description: Absence of new skin breakdown  Outcome: Ongoing  Note: No new skin breakdown noted at this time. Will continue to reassess. Turning patient every 2 hours. Problem: Discharge Planning:  Goal: Discharged to appropriate level of care  Description: Discharged to appropriate level of care  Outcome: Ongoing  Note: Patient plans to discharge to [place]  when medically stable. Problem: Bowel Function - Altered:  Goal: Bowel elimination is within specified parameters  Description: Bowel elimination is within specified parameters  Outcome: Ongoing  Note: Patient receiving colace. Monitoring intake and output. Having soft brown stool. Will continue to reassess. Problem: Cardiac Output - Decreased:  Goal: Hemodynamic stability will improve  Description: Hemodynamic stability will improve  Outcome: Ongoing  Note:   Vitals:    07/09/20 2100   BP: (!) 124/57   Pulse: 85   Resp: 18   Temp: 98.2 °F (36.8 °C)   SpO2: 95%     Monitoring vitals Q4 hours. Will continue to monitor. Problem: Mental Status - Impaired:  Goal: Mental status will be restored to baseline  Description: Mental status will be restored to baseline  Outcome: Ongoing  Note: Patient alert and oriented x4 with intermittent confusion. Will continue to reassess. Problem: Pain:  Goal: Control of acute pain  Description: Control of acute pain  Outcome: Ongoing  Note: Patient rates pain on 0-10 pain scale. States pain is a 0 on 0-10 scale. States goal is 0. Repositioned for comfort. Will continue to reassess.         Problem: Nutrition  Goal: Optimal nutrition therapy  Outcome: Ongoing  Note: Patient on carb control diet with 1800mL fluid restriction. Tolerating well. Will continue to reassess. Care plan reviewed with patient. Will continue to reassess.

## 2025-03-20 NOTE — TELEPHONE ENCOUNTER
Please send diabetic medications to endocrinology also inform patient she is overdue for follow-up and labs

## 2025-03-20 NOTE — TELEPHONE ENCOUNTER
Please review. Refill failed protocol.     Glipizide - discontinued on 12/31/2024 by Alycia Islas CPhT     Requesting to have prescriptions sent to [facility] pharmacy.

## 2025-03-27 RX ORDER — PEN NEEDLE, DIABETIC 31 GX5/16"
1 NEEDLE, DISPOSABLE MISCELLANEOUS 2 TIMES DAILY
Qty: 200 EACH | Refills: 3 | Status: SHIPPED | OUTPATIENT
Start: 2025-03-27

## 2025-04-09 ENCOUNTER — TELEPHONE (OUTPATIENT)
Dept: FAMILY MEDICINE CLINIC | Facility: CLINIC | Age: 65
End: 2025-04-09

## 2025-04-09 NOTE — TELEPHONE ENCOUNTER
Refill refused - sent prescription 3/20/25 for total 6 month supply.  Outpatient Medication Detail   Disp Refills Start End   metFORMIN HCl 1000 MG Oral Tab 180 tablet 1 3/20/2025    Sig - Route: Take 1 tablet (1,000 mg total) by mouth 2 (two) times daily with meals. - Oral   Sent to pharmacy as: metFORMIN HCl 1000 MG Oral Tablet (GLUCOPHAGE)   E-Prescribing Status: Receipt confirmed by pharmacy (3/20/2025  7:44 AM CDT)   Pharmacy  [facility] - [address] [phone], [phone]

## 2025-06-19 RX ORDER — PEN NEEDLE, DIABETIC 31 GX5/16"
1 NEEDLE, DISPOSABLE MISCELLANEOUS 2 TIMES DAILY
Qty: 200 EACH | Refills: 2 | Status: SHIPPED | OUTPATIENT
Start: 2025-06-19

## 2025-06-19 RX ORDER — ISOPROPYL ALCOHOL 0.7 ML/ML
1 SWAB TOPICAL 3 TIMES DAILY
Qty: 300 EACH | Refills: 0 | OUTPATIENT
Start: 2025-06-19

## 2025-06-19 NOTE — TELEPHONE ENCOUNTER
Please review; protocol failed/No Protocol      Sending what is left on script per protocol-different pharmacy requesting

## 2025-06-19 NOTE — TELEPHONE ENCOUNTER
Alcohol swabs: 1 year supply was sent to [facility] on 03/27/2025-[facility] pharmacy requesting

## 2025-07-30 ENCOUNTER — TELEPHONE (OUTPATIENT)
Dept: FAMILY MEDICINE CLINIC | Facility: CLINIC | Age: 65
End: 2025-07-30

## 2025-07-31 RX ORDER — LISINOPRIL 5 MG/1
5 TABLET ORAL DAILY
Qty: 90 TABLET | Refills: 3 | OUTPATIENT
Start: 2025-07-31

## 2025-08-06 RX ORDER — LISINOPRIL 5 MG/1
5 TABLET ORAL DAILY
Qty: 90 TABLET | Refills: 3 | OUTPATIENT
Start: 2025-08-06

## (undated) DEVICE — MEDI-VAC NON-CONDUCTIVE SUCTION TUBING 6MM X 1.8M (6FT.) L: Brand: CARDINAL HEALTH

## (undated) DEVICE — SNARE ENDOSCOPIC 10MM ROUND

## (undated) DEVICE — KIT ENDO ORCAPOD 160/180/190

## (undated) DEVICE — Device: Brand: DUAL NARE NASAL CANNULAE FEMALE LUER CON 7FT O2 TUBE

## (undated) DEVICE — KIT CLEAN ENDOKIT 1.1OZ GOWNX2

## (undated) DEVICE — FORCEP RADIAL JAW 4

## (undated) DEVICE — SNARE OPTMZ PLPCTM TRP

## (undated) NOTE — LETTER
05/31/18        [address] [place] [address]   [address]      Dear Rk Rousseau,    Our records indicate that you have outstanding lab work and or testing that was ordered for you and has not yet been completed:          Occult Blood, Fecal, Im

## (undated) NOTE — IP AVS SNAPSHOT
[address]  [address] [phone]                Discharge Summary   1/1/2017    [facility]           Admission Information        Provider Department    1/1/2017 Demetri La MD [facility] 5w Test blood sugar twice daily    Britney Blankenship                        Glucose Blood Strp   Commonly known as:  TRUE METRIX BLOOD GLUCOSE TEST        1 each by Other route 2 (two) times daily.  Apply one strip twice a day for blood glucose monitoring    Pattie ARMINDA EUCEDA.  [facility], [phone], [address] [facility] [place] [address]    Hours:  24-hours Phone:  [phone]    - Fluticasone Furoate-Vilanterol 100-25 MCG/INH Aepb  - ipratropium-albuterol 0.5-2.5 (3) MG/3ML Soln      [facility] Metabolic Lab Results  (Last result in the past 90 days)    HgbA1C Glucose BUN Creatinine Calcium Alkaline Phosph AST    (01/01/17)  6.9 (H) (01/03/17)  217 (H) (01/03/17)  12 (01/03/17)  0.63 (01/03/17)  8.7 -- --      Metabolic Lab Results  (Last result Lovastatin 10 MG Oral Tab       Use: Lower cholesterol, protect your heart   Most common side effects: Dizziness, constipation, abnormal liver function   What to report to your healthcare team:  Dizziness, muscle aches, constipation           Blood Sugar Glucose Blood (TRUE METRIX BLOOD GLUCOSE TEST) In Vitro Strip    Blood Glucose Monitoring Suppl (GLUCO PERFECT 3 METER) Does not apply Device    Glucose Blood (GLUCO PERFECT 3 TEST) In Vitro Strip

## (undated) NOTE — LETTER
September 25, 2017         Miko Sawant MD  [address] [facility]      Patient: Yfn Aguilar   YOB: 1960   Date of Visit: 9/25/2017       Dear Dr. Ricci Cabezas MD,    I saw your patient, Yfn Aguilar, on 9/25/2017.  Claribel Nolasco

## (undated) NOTE — LETTER
[address]  [address] [place], [place]  Authorization for Invasive Procedure                                                                                           1. I hereby authorize Rishi Bahena MD, my physician and his/her assistants (if applicable), which may include medical students, residents, and/or fellows, to perform the following surgical operation/ procedure and administer such anesthesia as may be determined necessary by my physician: Operation/Procedure name (s) COLONOSCOPY-SCREENING on [facility]   2. I recognize that during the surgical operation/procedure, unforeseen conditions may necessitate additional or different procedures than those listed above. I, therefore, further authorize and request that the above-named surgeon, assistants, or designees perform such procedures as are, in their judgment, necessary and desirable. 3.   My surgeon/physician has discussed prior to my surgery the potential benefits, risks and side effects of this procedure; the likelihood of achieving goals; and potential problems that might occur during recuperation. They also discussed reasonable alternatives to the procedure, including risks, benefits, and side effects related to the alternatives and risks related to not receiving this procedure. I have had all my questions answered and I acknowledge that no guarantee has been made as to the result that may be obtained. 4.   Should the need arise during my operation/procedure, which includes change of level of care prior to discharge, I also consent to the administration of blood and/or blood products. Further, I understand that despite careful testing and screening of blood or blood products by collecting agencies, I may still be subject to ill effects as a result of receiving a blood transfusion and/or blood products.   The following are some, but not all, of the potential risks that can occur: fever and allergic reactions, hemolytic reactions, transmission of diseases such as Hepatitis, AIDS and Cytomegalovirus (CMV) and fluid overload. In the event that I wish to have an autologous transfusion of my own blood, or a directed donor transfusion, I will discuss this with my physician. Check only if Refusing Blood or Blood Products  I understand refusal of blood or blood products as deemed necessary by my physician may have serious consequences to my condition to include possible death. I hereby assume responsibility for my refusal and release the hospital, its personnel, and my physicians from any responsibility for the consequences of my refusal.    o  Refuse   5. I authorize the use of any specimen, organs, tissues, body parts or foreign objects that may be removed from my body during the operation/procedure for diagnosis, research or teaching purposes and their subsequent disposal by hospital authorities. I also authorize the release of specimen test results and/or written reports to my treating physician on the hospital medical staff or other referring or consulting physicians involved in my care, at the discretion of the Pathologist or my treating physician. 6.   I consent to the photographing or videotaping of the operations or procedures to be performed, including appropriate portions of my body for medical, scientific, or educational purposes, provided my identity is not revealed by the pictures or by descriptive texts accompanying them. If the procedure has been photographed/videotaped, the surgeon will obtain the original picture, image, videotape or CD. The hospital will not be responsible for storage, release or maintenance of the picture, image, tape or CD.    7.   I consent to the presence of a  or observers in the operating room as deemed necessary by my physician or their designees.     8.   I recognize that in the event my procedure results in extended X-Ray/fluoroscopy time, I may develop a skin reaction. 9. If I have a Do Not Attempt Resuscitation (DNAR) order in place, that status will be suspended while in the operating room, procedural suite, and during the recovery period unless otherwise explicitly stated by me (or a person authorized to consent on my behalf). The surgeon or my attending physician will determine when the applicable recovery period ends for purposes of reinstating the DNAR order. 10. Patients having a sterilization procedure: I understand that if the procedure is successful the results will be permanent and it will therefore be impossible for me to inseminate, conceive, or bear children. I also understand that the procedure is intended to result in sterility, although the result has not been guaranteed. 11. I acknowledge that my physician has explained sedation/analgesia administration to me including the risk and benefits I consent to the administration of sedation/analgesia as may be necessary or desirable in the judgment of my physician. I CERTIFY THAT I HAVE READ AND FULLY UNDERSTAND THE ABOVE CONSENT TO OPERATION and/or OTHER PROCEDURE.     _________________________________________ _________________________________     ___________________________________  Signature of Patient     Signature of Responsible Person                   Printed Name of Responsible Person                              _________________________________________ ______________________________        ___________________________________  Signature of Witness         Date  Time         Relationship to Patient    STATEMENT OF PHYSICIAN My signature below affirms that prior to the time of the procedure; I have explained to the patient and/or his/her legal representative, the risks and benefits involved in the proposed treatment and any reasonable alternative to the proposed treatment.  I have also explained the risks and benefits involved in refusal of the proposed treatment and alternatives to the proposed treatment and have answered the patient's questions.  If I have a significant financial interest in a co-management agreement or a significant financial interest in any product or implant, or other significant relationship used in this procedure/surgery, I have disclosed this and had a discussion with my patient.     _______________________________________________________________ _____________________________  Zoya Nunn of Physician)                                                                                         (Date)                                   (Time)  Patient Name: Debra Hatfield    : 1960   Printed: 3/9/2023      Medical Record #: [id number]                                              Page 1 of

## (undated) NOTE — LETTER
12/20/19        [address] [facility] [place]  [address] [facility] [address]      Dear Geo Valentine,    [facility] records indicate that you have outstanding lab work and or testing that was ordered for you and has not yet been completed:  Orders Placed This Encounter      Comp Metabolic Panel (14) [E]      Lipid Panel [E]      Hemoglobin A1C [E]      Vitamin B12 [E]      Vitamin D, 25-Hydroxy [E]    To provide you with the best possible care, please complete these orders at your earliest convenience. If you have recently completed these orders please disregard this letter. If you have any questions please call the office at Dept: [phone]. Thank you,       Burgess Magallon.  Kirit Betts MD

## (undated) NOTE — MR AVS SNAPSHOT
[address]  [address] [facility]  [phone]               Thank you for choosing us for your health care visit with Re Blankenship MD.  We are glad to serve you and happy to provide you with this summary 1 each by Other route 2 (two) times daily.  Apply one strip twice a day for blood glucose monitoring   Commonly known as:  TRUE METRIX BLOOD GLUCOSE TEST           guaiFENesin-codeine 100-10 MG/5ML Soln   Take 10 mL by mouth 3 (three) times daily as needed Scheduling Instructions     Tuesday January 10, 2017     Imaging:  XR CHEST PA + LAT CHEST ([id number])    Instructions:   To schedule a test at any [facility] at [phone], Monday through Friday andrea day (2.4 g sodium or 6 g sodium chloride)   Aerobic physical activity Regular aerobic physical activity (e.g., brisk walking, light jogging, cycling, swimming, etc.) for a goal of at least 150 minutes per week.    Moderation of alcohol consumption Men: Margarito Javier

## (undated) NOTE — MR AVS SNAPSHOT
[facility] [place] [address]  [id number] [phone]               Thank you for choosing us for your health care visit with Dread Blankenship MD.  We are glad to serve you and happy to provide you with this summary of yo 1 each by Other route 2 (two) times daily.  Apply one strip twice a day for blood glucose monitoring   Commonly known as:  TRUE METRIX BLOOD GLUCOSE TEST           guaiFENesin-codeine 100-10 MG/5ML Soln   Take 10 mL by mouth 3 (three) times daily as needed long-term current use of insulin, macular edema presence unspecified, unspecified laterality, unspecified retinopathy severity [facility])   Order:  Ophthalmology - Internal    MD Adrian Paez [id number]   Phone:  11 unspecified laterality, unspecified retinopathy severity [facility] [E11.319]        PODIATRY - INTERNAL [[id number] CUSTOM]  Order #:  [id number]                  **REFERRAL REQUEST**    Your physician has referred you to a specialist.  Your physician or the clin

## (undated) NOTE — Clinical Note
Thank you for the consult. I saw Ms. Blankenship in the endocrine/diabetes clinic today. Please see attached my note. Please feel free to contact me with any questions. Thanks!

## (undated) NOTE — LETTER
03/24/18        [address] [place] [address] [address]  [address]      Dear Patricia Jones,    Our records indicate that you have outstanding lab work and or testing that was ordered for you and has not yet been completed:          Occult Blood, Fecal, Im

## (undated) NOTE — LETTER
07/30/18        [address]  [address]      Dear Severiano Memos,    Our records indicate that you have outstanding lab work and or testing that was ordered for you and has not yet been completed:          Occult Blood, Fecal, Im